# Patient Record
Sex: FEMALE | Race: BLACK OR AFRICAN AMERICAN | NOT HISPANIC OR LATINO | Employment: OTHER | URBAN - METROPOLITAN AREA
[De-identification: names, ages, dates, MRNs, and addresses within clinical notes are randomized per-mention and may not be internally consistent; named-entity substitution may affect disease eponyms.]

---

## 2023-10-10 ENCOUNTER — CLINICAL SUPPORT (OUTPATIENT)
Dept: OBSTETRICS AND GYNECOLOGY | Facility: HOSPITAL | Age: 43
End: 2023-10-10
Payer: MEDICAID

## 2023-10-10 DIAGNOSIS — Z30.42 ENCOUNTER FOR DEPO-PROVERA CONTRACEPTION: Primary | ICD-10-CM

## 2023-10-10 PROCEDURE — 2500000004 HC RX 250 GENERAL PHARMACY W/ HCPCS (ALT 636 FOR OP/ED): Performed by: OBSTETRICS & GYNECOLOGY

## 2023-10-10 RX ORDER — MEDROXYPROGESTERONE ACETATE 150 MG/ML
150 INJECTION, SUSPENSION INTRAMUSCULAR ONCE
Status: COMPLETED | OUTPATIENT
Start: 2023-10-10 | End: 2023-10-10

## 2023-10-10 RX ADMIN — MEDROXYPROGESTERONE ACETATE 150 MG: 150 INJECTION, SUSPENSION INTRAMUSCULAR at 10:20

## 2023-10-10 NOTE — PROGRESS NOTES
[facility]  [address]  [facility] [address]  [phone]     OBGYN Nurse Visit with   Cristiane Ramírez RN  Leida Lizama 1980   MRN: [id number]      Encounter Date: 10/10/2023    Diagnoses and all orders for this visit:  Encounter for Depo-Provera contraception (Primary)  -     medroxyPROGESTERone (Depo-Provera) injection 150 mg       received her last injection on 7/21  is due for her next injection between 12/26 and 1/9  last seen in 7/21  will need an annual exam in 2/17/2023  denies any concerns with injection   educated on the importance of compliance  given injection in the right Deltoid region, patient tolerated well  encouraged to schedule return visit prior to leaving the office  encouraged to call office if she has nay future questions or concerns  Patient verbalized understanding.  KATERINA Pope-RN

## 2023-11-06 ENCOUNTER — HOSPITAL ENCOUNTER (EMERGENCY)
Facility: HOSPITAL | Age: 43
Discharge: HOME | End: 2023-11-06
Attending: EMERGENCY MEDICINE
Payer: MEDICAID

## 2023-11-06 VITALS
WEIGHT: 160 LBS | SYSTOLIC BLOOD PRESSURE: 119 MMHG | HEIGHT: 65 IN | TEMPERATURE: 97.8 F | RESPIRATION RATE: 16 BRPM | OXYGEN SATURATION: 98 % | DIASTOLIC BLOOD PRESSURE: 78 MMHG | BODY MASS INDEX: 26.66 KG/M2 | HEART RATE: 86 BPM

## 2023-11-06 DIAGNOSIS — R00.2 PALPITATIONS: Primary | ICD-10-CM

## 2023-11-06 LAB
ALBUMIN SERPL BCP-MCNC: 4.4 G/DL (ref 3.4–5)
ALP SERPL-CCNC: 44 U/L (ref 33–110)
ALT SERPL W P-5'-P-CCNC: 12 U/L (ref 7–45)
ANION GAP SERPL CALC-SCNC: 15 MMOL/L (ref 10–20)
AST SERPL W P-5'-P-CCNC: 15 U/L (ref 9–39)
BASOPHILS # BLD AUTO: 0.04 X10*3/UL (ref 0–0.1)
BASOPHILS NFR BLD AUTO: 0.8 %
BILIRUB SERPL-MCNC: 0.9 MG/DL (ref 0–1.2)
BUN SERPL-MCNC: 11 MG/DL (ref 6–23)
CALCIUM SERPL-MCNC: 9.3 MG/DL (ref 8.6–10.6)
CHLORIDE SERPL-SCNC: 108 MMOL/L (ref 98–107)
CO2 SERPL-SCNC: 22 MMOL/L (ref 21–32)
CREAT SERPL-MCNC: 0.74 MG/DL (ref 0.5–1.05)
EOSINOPHIL # BLD AUTO: 0.1 X10*3/UL (ref 0–0.7)
EOSINOPHIL NFR BLD AUTO: 2 %
ERYTHROCYTE [DISTWIDTH] IN BLOOD BY AUTOMATED COUNT: 13.8 % (ref 11.5–14.5)
GFR SERPL CREATININE-BSD FRML MDRD: >90 ML/MIN/1.73M*2
GLUCOSE SERPL-MCNC: 88 MG/DL (ref 74–99)
HCT VFR BLD AUTO: 42.1 % (ref 36–46)
HGB BLD-MCNC: 13.3 G/DL (ref 12–16)
IMM GRANULOCYTES # BLD AUTO: 0.01 X10*3/UL (ref 0–0.7)
IMM GRANULOCYTES NFR BLD AUTO: 0.2 % (ref 0–0.9)
LYMPHOCYTES # BLD AUTO: 1.15 X10*3/UL (ref 1.2–4.8)
LYMPHOCYTES NFR BLD AUTO: 23.5 %
MAGNESIUM SERPL-MCNC: 2.04 MG/DL (ref 1.6–2.4)
MCH RBC QN AUTO: 27.5 PG (ref 26–34)
MCHC RBC AUTO-ENTMCNC: 31.6 G/DL (ref 32–36)
MCV RBC AUTO: 87 FL (ref 80–100)
MONOCYTES # BLD AUTO: 0.24 X10*3/UL (ref 0.1–1)
MONOCYTES NFR BLD AUTO: 4.9 %
NEUTROPHILS # BLD AUTO: 3.36 X10*3/UL (ref 1.2–7.7)
NEUTROPHILS NFR BLD AUTO: 68.6 %
NRBC BLD-RTO: 0 /100 WBCS (ref 0–0)
PLATELET # BLD AUTO: 298 X10*3/UL (ref 150–450)
POTASSIUM SERPL-SCNC: 4.2 MMOL/L (ref 3.5–5.3)
PROT SERPL-MCNC: 7.8 G/DL (ref 6.4–8.2)
RBC # BLD AUTO: 4.83 X10*6/UL (ref 4–5.2)
SODIUM SERPL-SCNC: 141 MMOL/L (ref 136–145)
WBC # BLD AUTO: 4.9 X10*3/UL (ref 4.4–11.3)

## 2023-11-06 PROCEDURE — 80053 COMPREHEN METABOLIC PANEL: CPT | Performed by: STUDENT IN AN ORGANIZED HEALTH CARE EDUCATION/TRAINING PROGRAM

## 2023-11-06 PROCEDURE — 99285 EMERGENCY DEPT VISIT HI MDM: CPT | Performed by: EMERGENCY MEDICINE

## 2023-11-06 PROCEDURE — 36415 COLL VENOUS BLD VENIPUNCTURE: CPT | Performed by: STUDENT IN AN ORGANIZED HEALTH CARE EDUCATION/TRAINING PROGRAM

## 2023-11-06 PROCEDURE — 99283 EMERGENCY DEPT VISIT LOW MDM: CPT

## 2023-11-06 PROCEDURE — 99284 EMERGENCY DEPT VISIT MOD MDM: CPT | Performed by: EMERGENCY MEDICINE

## 2023-11-06 PROCEDURE — 85025 COMPLETE CBC W/AUTO DIFF WBC: CPT | Performed by: STUDENT IN AN ORGANIZED HEALTH CARE EDUCATION/TRAINING PROGRAM

## 2023-11-06 PROCEDURE — 93005 ELECTROCARDIOGRAM TRACING: CPT

## 2023-11-06 PROCEDURE — 83735 ASSAY OF MAGNESIUM: CPT | Performed by: STUDENT IN AN ORGANIZED HEALTH CARE EDUCATION/TRAINING PROGRAM

## 2023-11-06 ASSESSMENT — PAIN SCALES - GENERAL: PAINLEVEL_OUTOF10: 5 - MODERATE PAIN

## 2023-11-06 ASSESSMENT — PAIN - FUNCTIONAL ASSESSMENT: PAIN_FUNCTIONAL_ASSESSMENT: 0-10

## 2023-11-06 ASSESSMENT — PAIN DESCRIPTION - LOCATION: LOCATION: CHEST

## 2023-11-06 ASSESSMENT — PAIN DESCRIPTION - PAIN TYPE: TYPE: ACUTE PAIN

## 2023-11-06 NOTE — ED PROVIDER NOTES
HPI   Chief Complaint   Patient presents with    Chest Pain     CP x a while       43-year-old female with history of prior meningioma status postresection 2021 presenting to the emergency department complaints of palpitations intermittently for the past month.  No palpitations happen at rest, notwith exercise, no exercise intolerance, no significant chest pain or shortness of breath.  Has not followed up with cardiology, does not have a PCP.  Denies any history of syncope, near syncope, family history of syncope or sudden cardiac death.  Denies any other complaints at this time.  States no significant headaches, no dizziness, chills, abdominal pain, nausea, vomiting      History provided by:  Patient                      No data recorded                Patient History   Past Medical History:   Diagnosis Date    Acute candidiasis of vulva and vagina     Vaginal candidiasis    Acute vaginitis 10/30/2014    Bacterial vaginosis    Acute vaginitis 08/22/2019    Bacterial vaginosis    Candidiasis, unspecified 03/09/2020    Yeast infection    Dermatitis, unspecified 08/22/2019    Eczema    Encounter for gynecological examination (general) (routine) without abnormal findings 08/24/2018    Encounter for well woman exam with routine gynecological exam    Encounter for pregnancy test, result negative 05/04/2020    Negative pregnancy test    Encounter for screening for infections with a predominantly sexual mode of transmission 07/30/2014    Routine screening for STI (sexually transmitted infection)    Encounter for screening for malignant neoplasm of vagina     Vaginal Pap smear    Encounter for surveillance of injectable contraceptive 01/10/2022    Encounter for Depo-Provera contraception    Excessive and frequent menstruation with irregular cycle 02/10/2020    Breakthrough bleeding on Depo-Provera    Genetic susceptibility to other disease     Heterozygous MTHFR mutation [id number]    Irregular menstruation, unspecified  10/30/2014    Irregular menstrual cycle    Left lower quadrant pain     Left lower quadrant pain    Maternal care for unspecified type scar from previous  delivery 2016    Previous  delivery affecting pregnancy    Other conditions influencing health status     H/O pregnancy    Other conditions influencing health status     Menstruation    Ovarian cyst 2013    Ovarian cyst    Personal history of diseases of the blood and blood-forming organs and certain disorders involving the immune mechanism     History of anemia    Personal history of other benign neoplasm     History of uterine leiomyoma    Personal history of other diseases of the female genital tract 10/04/2019    History of irregular menstrual cycles    Personal history of other infectious and parasitic diseases     History of sexually transmitted disease    Personal history of other specified conditions     History of vomiting    Personal history of other specified conditions     History of nausea    Personal history of other specified conditions 10/30/2014    History of abdominal pain    Type A blood, Rh positive     Blood type A+    Unspecified infection of urinary tract in pregnancy, unspecified trimester 2016    UTI (urinary tract infection) in pregnancy, antepartum    Unspecified pre-eclampsia, unspecified trimester     Preeclampsia    Vomiting of pregnancy, unspecified 2016    Nausea/vomiting in pregnancy     Past Surgical History:   Procedure Laterality Date     SECTION, CLASSIC  2014     Section    COLPOSCOPY  2014    Colposcopy    DILATION AND CURETTAGE OF UTERUS  2014    Dilation And Curettage    OTHER SURGICAL HISTORY  2014    Surgical Treatment Of Spontaneous     TUBAL LIGATION  2016    Tubal Ligation     No family history on file.  Social History     Tobacco Use    Smoking status: Not on file    Smokeless tobacco: Not on file   Substance Use Topics     Alcohol use: Not on file    Drug use: Not on file       Physical Exam   ED Triage Vitals [11/06/23 0959]   Temp Heart Rate Resp BP   36.6 °C (97.8 °F) 86 16 119/78      SpO2 Temp src Heart Rate Source Patient Position   98 % -- Monitor --      BP Location FiO2 (%)     -- --       Physical Exam  Vitals and nursing note reviewed.   Constitutional:       General: She is not in acute distress.     Appearance: She is well-developed.   HENT:      Head: Normocephalic and atraumatic.   Eyes:      Conjunctiva/sclera: Conjunctivae normal.   Cardiovascular:      Rate and Rhythm: Normal rate and regular rhythm.      Pulses:           Radial pulses are 2+ on the right side and 2+ on the left side.      Heart sounds: Normal heart sounds. No murmur heard.     No S3 or S4 sounds.   Pulmonary:      Effort: Pulmonary effort is normal. No respiratory distress.      Breath sounds: Normal breath sounds.   Abdominal:      Palpations: Abdomen is soft.      Tenderness: There is no abdominal tenderness.   Musculoskeletal:         General: No swelling.      Cervical back: Neck supple.   Skin:     General: Skin is warm and dry.   Neurological:      General: No focal deficit present.      Mental Status: She is alert.   Psychiatric:         Mood and Affect: Mood normal.         ED Course & MDM   ED Course as of 11/06/23 1244   Mon Nov 06, 2023   1058 ECG 12 lead  EKG obtained at 1003 demonstrated normal sinus rhythm with mild sinus arrhythmia, rate of 85, normal axis, normal intervals, no ST segment or T wave signs of ischemia.  waves, delta waves, evidence of brugada syndrome. [SH]   1151 CBC and Auto Differential(!)  CBC with normal white blood cell count, normal hemoglobin, platelets [SH]   1221 Comprehensive metabolic panel(!)  CMP normal [SH]   1221 Magnesium  Mag normal [SH]      ED Course User Index  [SH] Ross Luna MD         Diagnoses as of 11/06/23 1244   Palpitations       Medical Decision Making  43-year-old female presents  emergency department with palpitations for the past month.  On arrival vital signs within normal limits, patient well-appearing.  Exam unremarkable.  Labs obtained, unremarkable for etiology of her palpitations.  No exercise intolerance.  Low concern for intermittent arrhythmias given lack of syncopal episodes, no family risk factors.  EKG obtained, also unremarkable for risk factor for syncope.  Will discharge at this time with referral to cardiology as well as primary care.  Discussed return precautions.    As a result of the work-up, patient was discharged home.  They were informed of their diagnosis and instructed to come back with any concerns or worsening of condition and was agreeable to the plan as discussed above.  The patient was given the opportunity to ask questions.  All of the patient's questions were answered.  The patient remained stable under my care.    Amount and/or Complexity of Data Reviewed  Labs: ordered. Decision-making details documented in ED Course.  ECG/medicine tests: ordered and independent interpretation performed. Decision-making details documented in ED Course.        Procedure  Procedures     Ross Luna MD  Resident  11/06/23 [id number]

## 2023-11-06 NOTE — Clinical Note
Leida Lizama was seen and treated in our emergency department on 11/6/2023.  She may return to work on 11/07/2023.       If you have any questions or concerns, please don't hesitate to call.      Ross Luna MD

## 2023-11-06 NOTE — DISCHARGE INSTRUCTIONS
Please call the above numbers to schedule follow-up appoint with primary care as well as cardiology.  Please return with any worsening of your symptoms or any other concerns.

## 2023-11-07 ENCOUNTER — CLINICAL SUPPORT (OUTPATIENT)
Dept: EMERGENCY MEDICINE | Facility: HOSPITAL | Age: 43
End: 2023-11-07
Payer: MEDICAID

## 2023-11-07 LAB
ATRIAL RATE: 85 BPM
P AXIS: 60 DEGREES
P OFFSET: 200 MS
P ONSET: 149 MS
PR INTERVAL: 144 MS
Q ONSET: 221 MS
QRS COUNT: 14 BEATS
QRS DURATION: 78 MS
QT INTERVAL: 348 MS
QTC CALCULATION(BAZETT): 414 MS
QTC FREDERICIA: 390 MS
R AXIS: 86 DEGREES
T AXIS: 36 DEGREES
T OFFSET: 395 MS
VENTRICULAR RATE: 85 BPM

## 2023-12-20 PROBLEM — O47.03 THREATENED PREMATURE LABOR IN THIRD TRIMESTER (HHS-HCC): Status: ACTIVE | Noted: 2023-12-20
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
## 2023-12-20 PROBLEM — R82.90 ABNORMAL URINE: Status: ACTIVE | Noted: 2023-12-20

## 2023-12-20 PROBLEM — S39.012A LUMBAR STRAIN: Status: ACTIVE | Noted: 2023-12-20

## 2023-12-20 PROBLEM — M25.473 ANKLE SWELLING: Status: ACTIVE | Noted: 2023-12-20

## 2023-12-20 PROBLEM — M54.2 NECK PAIN: Status: ACTIVE | Noted: 2021-09-17

## 2023-12-20 PROBLEM — D32.0 BENIGN NEOPLASM OF CEREBRAL MENINGES (MULTI): Chronic | Status: ACTIVE | Noted: 2022-05-05

## 2023-12-20 PROBLEM — O36.8190: Status: ACTIVE | Noted: 2023-12-20

## 2023-12-20 PROBLEM — D31.60: Status: ACTIVE | Noted: 2023-12-20

## 2023-12-20 PROBLEM — D32.9 MENINGIOMA (MULTI): Status: ACTIVE | Noted: 2023-12-20

## 2023-12-20 PROBLEM — H10.9 CONJUNCTIVITIS: Status: ACTIVE | Noted: 2023-12-20

## 2023-12-20 PROBLEM — R07.9 CHEST PAIN: Status: ACTIVE | Noted: 2021-11-08

## 2023-12-20 PROBLEM — S39.012A BACK STRAIN: Status: ACTIVE | Noted: 2023-12-20

## 2023-12-20 PROBLEM — M54.6 PAIN IN THORACIC SPINE: Status: ACTIVE | Noted: 2021-09-17

## 2023-12-20 PROBLEM — K21.9 GASTRO-ESOPHAGEAL REFLUX DISEASE WITHOUT ESOPHAGITIS: Status: ACTIVE | Noted: 2021-12-11
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
## 2023-12-20 PROBLEM — N89.8 VAGINAL ODOR: Status: ACTIVE | Noted: 2023-12-20

## 2023-12-20 PROBLEM — B37.31 YEAST VAGINITIS: Status: ACTIVE | Noted: 2023-12-20

## 2023-12-20 PROBLEM — O34.219 PREVIOUS CESAREAN DELIVERY AFFECTING PREGNANCY (HHS-HCC): Status: ACTIVE | Noted: 2023-12-20

## 2023-12-20 PROBLEM — H47.099 COMPRESSIVE OPTIC NEUROPATHY: Status: ACTIVE | Noted: 2023-12-20

## 2023-12-20 PROBLEM — B96.89 BACTERIAL VAGINOSIS: Status: ACTIVE | Noted: 2023-12-20

## 2023-12-20 PROBLEM — H46.8 COMPRESSIVE OPTIC NEUROPATHY: Status: ACTIVE | Noted: 2023-12-20

## 2023-12-20 PROBLEM — H05.10: Status: ACTIVE | Noted: 2023-12-20

## 2023-12-20 PROBLEM — H54.61 UNQUALIFIED VISUAL LOSS, RIGHT EYE, NORMAL VISION LEFT EYE: Status: ACTIVE | Noted: 2021-12-11

## 2023-12-20 PROBLEM — L70.9 ACNE: Status: ACTIVE | Noted: 2022-01-12

## 2023-12-20 PROBLEM — D31.91: Status: ACTIVE | Noted: 2023-12-20

## 2023-12-20 PROBLEM — O99.019 MATERNAL IRON DEFICIENCY ANEMIA AFFECTING PREGNANCY, ANTEPARTUM (HHS-HCC): Status: ACTIVE | Noted: 2023-12-20

## 2023-12-20 PROBLEM — L30.9 ECZEMA: Status: ACTIVE | Noted: 2023-12-20

## 2023-12-20 PROBLEM — A59.01 TRICHOMONAS VAGINITIS: Status: ACTIVE | Noted: 2023-12-20

## 2023-12-20 PROBLEM — N76.0 BACTERIAL VAGINOSIS: Status: ACTIVE | Noted: 2023-12-20

## 2023-12-20 PROBLEM — B36.0 PITYRIASIS VERSICOLOR: Status: ACTIVE | Noted: 2022-01-12

## 2023-12-20 PROBLEM — N73.0 PID (ACUTE PELVIC INFLAMMATORY DISEASE): Status: ACTIVE | Noted: 2023-12-20

## 2023-12-20 PROBLEM — N39.0 URINARY TRACT INFECTION: Status: ACTIVE | Noted: 2023-12-20

## 2023-12-20 PROBLEM — D50.9 MATERNAL IRON DEFICIENCY ANEMIA AFFECTING PREGNANCY, ANTEPARTUM (HHS-HCC): Status: ACTIVE | Noted: 2023-12-20

## 2023-12-20 PROBLEM — O34.219 DELIVERY WITH HISTORY OF CESAREAN SECTION (HHS-HCC): Status: ACTIVE | Noted: 2023-12-20

## 2023-12-20 PROBLEM — S16.1XXA CERVICAL STRAIN: Status: ACTIVE | Noted: 2023-12-20

## 2023-12-20 PROBLEM — H93.A2 SUBJECTIVE PULSATILE TINNITUS OF LEFT EAR: Status: ACTIVE | Noted: 2023-12-20

## 2023-12-20 PROBLEM — D31.61: Status: ACTIVE | Noted: 2022-05-05

## 2023-12-20 PROBLEM — H53.40 VISUAL FIELD DEFECT OF RIGHT EYE: Status: ACTIVE | Noted: 2023-12-20

## 2023-12-20 PROBLEM — N89.8 VAGINAL DISCHARGE: Status: ACTIVE | Noted: 2023-12-20

## 2023-12-20 PROBLEM — H05.20 PROPTOSIS: Status: ACTIVE | Noted: 2023-12-20

## 2023-12-20 PROBLEM — H90.3 SENSORINEURAL HEARING LOSS (SNHL) OF BOTH EARS: Status: ACTIVE | Noted: 2022-02-16

## 2023-12-20 PROBLEM — N92.1 BREAKTHROUGH BLEEDING ON DEPO-PROVERA: Status: ACTIVE | Noted: 2023-12-20

## 2023-12-20 PROBLEM — H54.7 VISION LOSS: Status: ACTIVE | Noted: 2023-12-20

## 2023-12-20 RX ORDER — FLUTICASONE PROPIONATE 50 MCG
1 SPRAY, SUSPENSION (ML) NASAL DAILY
COMMUNITY
Start: 2022-12-27 | End: 2024-03-12 | Stop reason: WASHOUT

## 2023-12-20 RX ORDER — CETIRIZINE HYDROCHLORIDE 10 MG/1
10 TABLET ORAL DAILY
COMMUNITY
Start: 2023-05-11 | End: 2024-03-12 | Stop reason: WASHOUT

## 2023-12-20 RX ORDER — MEDROXYPROGESTERONE ACETATE 150 MG/ML
150 INJECTION, SUSPENSION INTRAMUSCULAR
COMMUNITY

## 2023-12-20 RX ORDER — MEDROXYPROGESTERONE ACETATE 150 MG/ML
1 INJECTION, SUSPENSION INTRAMUSCULAR
COMMUNITY
End: 2024-03-12 | Stop reason: WASHOUT

## 2023-12-20 RX ORDER — CHLORHEXIDINE GLUCONATE 40 MG/ML
SOLUTION TOPICAL
COMMUNITY
Start: 2021-11-30 | End: 2024-03-12 | Stop reason: WASHOUT

## 2023-12-20 RX ORDER — IBUPROFEN 800 MG/1
1 TABLET ORAL EVERY 8 HOURS PRN
COMMUNITY
Start: 2018-10-04 | End: 2024-03-12 | Stop reason: WASHOUT

## 2023-12-20 RX ORDER — CEPHALEXIN 500 MG/1
500 CAPSULE ORAL 3 TIMES DAILY
COMMUNITY
Start: 2023-03-14 | End: 2024-03-12 | Stop reason: WASHOUT

## 2023-12-20 RX ORDER — METRONIDAZOLE 7.5 MG/G
1 GEL VAGINAL
COMMUNITY
Start: 2022-08-30 | End: 2024-03-12 | Stop reason: WASHOUT

## 2023-12-20 RX ORDER — PROMETHAZINE HYDROCHLORIDE 25 MG/1
25 TABLET ORAL EVERY 6 HOURS PRN
COMMUNITY
Start: 2023-03-14 | End: 2024-03-12 | Stop reason: WASHOUT

## 2023-12-20 RX ORDER — KETOCONAZOLE 20 MG/ML
SHAMPOO, SUSPENSION TOPICAL
COMMUNITY
Start: 2018-09-04 | End: 2024-03-12 | Stop reason: WASHOUT

## 2023-12-20 RX ORDER — MUPIROCIN 20 MG/G
1 OINTMENT TOPICAL 2 TIMES DAILY
COMMUNITY
Start: 2021-11-30 | End: 2024-03-12 | Stop reason: WASHOUT

## 2023-12-20 RX ORDER — LORATADINE 10 MG/1
10 TABLET ORAL DAILY PRN
COMMUNITY
Start: 2022-12-09 | End: 2024-03-12 | Stop reason: WASHOUT

## 2023-12-20 RX ORDER — LIDOCAINE 50 MG/G
1 PATCH TOPICAL DAILY
COMMUNITY
Start: 2023-07-10 | End: 2024-03-12 | Stop reason: WASHOUT

## 2023-12-20 RX ORDER — ADAPALENE 1 MG/G
GEL TOPICAL
COMMUNITY
Start: 2018-09-04 | End: 2024-03-12 | Stop reason: WASHOUT

## 2023-12-20 RX ORDER — OXYCODONE AND ACETAMINOPHEN 5; 325 MG/1; MG/1
1 TABLET ORAL EVERY 4 HOURS PRN
COMMUNITY
Start: 2023-03-17 | End: 2024-03-12 | Stop reason: WASHOUT

## 2023-12-27 ENCOUNTER — CLINICAL SUPPORT (OUTPATIENT)
Dept: OBSTETRICS AND GYNECOLOGY | Facility: CLINIC | Age: 43
End: 2023-12-27
Payer: MEDICAID

## 2023-12-27 DIAGNOSIS — Z30.42 ENCOUNTER FOR DEPO-PROVERA CONTRACEPTION: Primary | ICD-10-CM

## 2023-12-27 PROCEDURE — 96372 THER/PROPH/DIAG INJ SC/IM: CPT | Performed by: OBSTETRICS & GYNECOLOGY

## 2023-12-27 PROCEDURE — 2500000004 HC RX 250 GENERAL PHARMACY W/ HCPCS (ALT 636 FOR OP/ED): Mod: SE | Performed by: OBSTETRICS & GYNECOLOGY

## 2023-12-27 RX ORDER — MEDROXYPROGESTERONE ACETATE 150 MG/ML
150 INJECTION, SUSPENSION INTRAMUSCULAR ONCE
Status: COMPLETED | OUTPATIENT
Start: 2023-12-27 | End: 2023-12-27

## 2023-12-27 RX ADMIN — MEDROXYPROGESTERONE ACETATE 150 MG: 150 INJECTION, SUSPENSION INTRAMUSCULAR at 10:30

## 2023-12-27 ASSESSMENT — PAIN - FUNCTIONAL ASSESSMENT: PAIN_FUNCTIONAL_ASSESSMENT: 0-10

## 2023-12-27 ASSESSMENT — PAIN SCALES - GENERAL: PAINLEVEL_OUTOF10: 0 - NO PAIN

## 2023-12-27 NOTE — PROGRESS NOTES
Patient here for Depo injection.  Last Depo: 10/10/23  Last Annual: 02/17/23  RN discussed Depo-Provera side effects.  Depo given IM into right deltoid. Depo supplied by office.   Patient tolerated well.  Patient to RTC between 3/14/24-4/4/24 for depo and annual 2/2024.  Patient verbalized understanding and all questions were answered.

## 2024-01-02 ENCOUNTER — ANCILLARY PROCEDURE (OUTPATIENT)
Dept: RADIOLOGY | Facility: CLINIC | Age: 44
End: 2024-01-02
Payer: MEDICAID

## 2024-01-02 VITALS — HEIGHT: 65 IN | BODY MASS INDEX: 26.45 KG/M2 | WEIGHT: 158.73 LBS

## 2024-01-02 DIAGNOSIS — D32.9 BENIGN NEOPLASM OF MENINGES, UNSPECIFIED (MULTI): ICD-10-CM

## 2024-01-02 PROCEDURE — A9575 INJ GADOTERATE MEGLUMI 0.1ML: HCPCS | Mod: SE | Performed by: STUDENT IN AN ORGANIZED HEALTH CARE EDUCATION/TRAINING PROGRAM

## 2024-01-02 PROCEDURE — 2550000001 HC RX 255 CONTRASTS: Mod: SE | Performed by: STUDENT IN AN ORGANIZED HEALTH CARE EDUCATION/TRAINING PROGRAM

## 2024-01-02 PROCEDURE — 70553 MRI BRAIN STEM W/O & W/DYE: CPT | Performed by: RADIOLOGY

## 2024-01-02 PROCEDURE — 70553 MRI BRAIN STEM W/O & W/DYE: CPT

## 2024-01-02 RX ORDER — GADOTERATE MEGLUMINE 376.9 MG/ML
0.2 INJECTION INTRAVENOUS
Status: COMPLETED | OUTPATIENT
Start: 2024-01-02 | End: 2024-01-02

## 2024-01-02 RX ADMIN — GADOTERATE MEGLUMINE 14 ML: 376.9 INJECTION INTRAVENOUS at 09:50

## 2024-01-05 ENCOUNTER — TELEPHONE (OUTPATIENT)
Dept: RADIATION ONCOLOGY | Facility: HOSPITAL | Age: 44
End: 2024-01-05
Payer: COMMERCIAL

## 2024-01-09 ENCOUNTER — APPOINTMENT (OUTPATIENT)
Dept: RADIATION ONCOLOGY | Facility: HOSPITAL | Age: 44
End: 2024-01-09
Payer: MEDICAID

## 2024-01-09 ENCOUNTER — TELEPHONE (OUTPATIENT)
Dept: RADIATION ONCOLOGY | Facility: HOSPITAL | Age: 44
End: 2024-01-09
Payer: COMMERCIAL

## 2024-01-09 NOTE — TELEPHONE ENCOUNTER
Called pt. to remind of appointment on 1/15/2024 at 1:00 pm with Dr. Falcon. Pt answered and confirmed  appointment.

## 2024-01-15 ENCOUNTER — HOSPITAL ENCOUNTER (OUTPATIENT)
Dept: RADIATION ONCOLOGY | Facility: HOSPITAL | Age: 44
Setting detail: RADIATION/ONCOLOGY SERIES
Discharge: HOME | End: 2024-01-15
Payer: COMMERCIAL

## 2024-01-15 VITALS
OXYGEN SATURATION: 99 % | BODY MASS INDEX: 26.26 KG/M2 | DIASTOLIC BLOOD PRESSURE: 78 MMHG | HEIGHT: 65 IN | SYSTOLIC BLOOD PRESSURE: 130 MMHG | HEART RATE: 82 BPM | TEMPERATURE: 96.8 F | RESPIRATION RATE: 18 BRPM | WEIGHT: 157.63 LBS

## 2024-01-15 DIAGNOSIS — D32.9 MENINGIOMA (MULTI): Primary | ICD-10-CM

## 2024-01-15 PROCEDURE — 99213 OFFICE O/P EST LOW 20 MIN: CPT | Performed by: STUDENT IN AN ORGANIZED HEALTH CARE EDUCATION/TRAINING PROGRAM

## 2024-01-18 ASSESSMENT — ENCOUNTER SYMPTOMS
CARDIOVASCULAR NEGATIVE: 1
LIGHT-HEADEDNESS: 1
CONSTITUTIONAL NEGATIVE: 1
HEADACHES: 1
MUSCULOSKELETAL NEGATIVE: 1
DIZZINESS: 1
NUMBNESS: 1
EYE PROBLEMS: 1
EXTREMITY WEAKNESS: 0
RESPIRATORY NEGATIVE: 1
PSYCHIATRIC NEGATIVE: 1
GASTROINTESTINAL NEGATIVE: 1

## 2024-01-18 NOTE — PROGRESS NOTES
Radiation Oncology Follow-Up    Patient Name:  Leida Lizama  MRN:  [id number]  :  1980    Referring Provider: No ref. provider found  Primary Care Provider: No Assigned PCP Generic Provider, MD  Care Team: Patient Care Team:  No Assigned Pcp Generic Provider, MD as PCP - General (Internal Medicine)    Date of Service: 1/15/2024     SUBJECTIVE  History of Present Illness:   Leida Lizama is a 43 y.o. 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 She follows with ophthalmology, last saw Dr. Underwood 2023. The patient had an ED visit on 23 with palpitations with no intervention. She presents for routine follow-up. The patient continues to endorse blurry vision of her R eye that is stable since her last visit. She endorses persistent numbness of the right side of her face, episodes of dizziness, and left-sided headaches. However, she describes her dizziness episodes as feeling light-headed and hot.    Treatment Rendered:    Right Orbital Base of skull meningioma: Proton 5940 cGy in 33 fractions completed 2022   Left Clivus Meningioma: GKRS 13 Gy to 57% IDL completed on 2022    Review of Systems:   Review of Systems   Constitutional: Negative.    HENT:   Negative for hearing loss.    Eyes:  Positive for eye problems.   Respiratory: Negative.     Cardiovascular: Negative.    Gastrointestinal: Negative.    Musculoskeletal: Negative.    Neurological:  Positive for dizziness, headaches, light-headedness and numbness. Negative for extremity weakness.   Psychiatric/Behavioral: Negative.       The patient's current pain level was assessed.  They report currently having a pain of 1 out of 10.    Performance Status:   The Karnofsky  "performance scale today is 80, Normal activity with effort; some signs or symptoms of disease (ECOG equivalent 1).        OBJECTIVE  Vital Signs:  /78   Pulse 82   Temp 36 °C (96.8 °F) (Skin)   Resp 18   Ht 1.651 m (5' 5\")   Wt 71.5 kg (157 lb 10.1 oz)   SpO2 99%   BMI 26.23 kg/m²    Physical Exam:  Physical Exam  Constitutional:       General: She is not in acute distress.  HENT:      Mouth/Throat:      Mouth: Mucous membranes are moist.   Eyes:      Extraocular Movements: Extraocular movements intact.      Pupils: Pupils are equal, round, and reactive to light.   Cardiovascular:      Rate and Rhythm: Normal rate.   Pulmonary:      Effort: Pulmonary effort is normal. No respiratory distress.   Abdominal:      General: Abdomen is flat.   Musculoskeletal:      Cervical back: Normal range of motion.      Right lower leg: No edema.      Left lower leg: No edema.   Skin:     General: Skin is warm and dry.   Neurological:      Mental Status: She is alert.      Comments: Motor strength 5/5 b/l upper and lower extremities  No sensory deficit  visual fields intact to confrontation   Psychiatric:         Mood and Affect: Mood normal.         Behavior: Behavior normal.     MR BRAIN W AND WO IV CONTRAST;  1/2/2024 10:07 am        COMPARISON:  June 2023.      FINDINGS:  Previous right pterional craniotomy and orbitotomy. Residual bony  hypertrophy at the greater wing of the sphenoid extending into the  right temporal bone and anterior clinoid process unchanged. Unchanged  meningeal thickening and enhancement along the posterior wall of the  left orbit, foramen rotundum and inferior orbital fissure. Thickening  and enhancement extends for a 1 cm distance in the floor of the right  middle cranial fossa and along the lateral dural margin of the right  cavernous sinus. No evidence of extension to the pituitary      No new foci of abnormal enhancement to suggest additional lesions.      Normal contrast opacification of " dural venous sinuses      There is a normal-size ventricular system.  There is no evidence of  intracranial mass or extra-axial collection.  The skull base,  paranasal sinuses and orbital structures are otherwise unremarkable.  Diffusion weighted images and associated ADC maps of the brain were  unremarkable.  There is no evidence of diffusion restriction to  suggest the presence of acute infarction. Gradient echo T2 weighted  images fail to demonstrate hemosiderin deposition or other evidence  of hemorrhage.      IMPRESSION:  * Postoperative changes and residual meningioma as previously  demonstrated and described * There is no measurable change compared  to the previous exam.     ASSESSMENT:  Leida Lizama is a 43 y.o. 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  She presents for routine follow-up with stable neurologic deficits (blurry vision of her R eye, numbness of the right side of her face) with episodes of dizziness, and left-sided headaches.     PLAN:  Follow up in 6 months with MRI brain. Patient was instructed to contact the office with any worsening of her symptoms.    NCCN Guidelines were applicable to guide this patients treatment plan.  The patient was assessed and plan discussed with the attending radiation oncologist Dr. Falcon.    Deepali Burgos MD  PGY-2 Radiation Oncology Resident  On-call pager [phone]  Available on Epic Secure Chat

## 2024-01-18 NOTE — ADDENDUM NOTE
Encounter addended by: Deepali Burgos MD on: 1/18/2024 4:11 PM   Actions taken: Pend clinical note, SmartForm saved, Clinical Note Signed

## 2024-01-22 NOTE — ADDENDUM NOTE
Encounter addended by: Travis Falcon MD on: 1/22/2024 1:02 PM   Actions taken: Level of Service modified, Clinical Note Signed

## 2024-03-12 ENCOUNTER — OFFICE VISIT (OUTPATIENT)
Dept: OBSTETRICS AND GYNECOLOGY | Facility: CLINIC | Age: 44
End: 2024-03-12
Payer: COMMERCIAL

## 2024-03-12 VITALS — HEIGHT: 65 IN | BODY MASS INDEX: 27.16 KG/M2 | WEIGHT: 163 LBS

## 2024-03-12 DIAGNOSIS — B37.9 YEAST INFECTION: ICD-10-CM

## 2024-03-12 DIAGNOSIS — N89.8 VAGINAL DISCHARGE: Primary | ICD-10-CM

## 2024-03-12 PROCEDURE — 1036F TOBACCO NON-USER: CPT | Performed by: OBSTETRICS & GYNECOLOGY

## 2024-03-12 PROCEDURE — 99203 OFFICE O/P NEW LOW 30 MIN: CPT | Performed by: OBSTETRICS & GYNECOLOGY

## 2024-03-12 PROCEDURE — 87205 SMEAR GRAM STAIN: CPT

## 2024-03-12 PROCEDURE — 87800 DETECT AGNT MULT DNA DIREC: CPT | Performed by: OBSTETRICS & GYNECOLOGY

## 2024-03-12 PROCEDURE — 87661 TRICHOMONAS VAGINALIS AMPLIF: CPT | Mod: 59 | Performed by: OBSTETRICS & GYNECOLOGY

## 2024-03-12 RX ORDER — TERCONAZOLE 8 MG/G
1 CREAM VAGINAL NIGHTLY
Qty: 20 G | Refills: 1 | Status: SHIPPED | OUTPATIENT
Start: 2024-03-12 | End: 2024-03-15

## 2024-03-12 ASSESSMENT — ENCOUNTER SYMPTOMS
APPETITE CHANGE: 0
UNEXPECTED WEIGHT CHANGE: 0
COLOR CHANGE: 0
NAUSEA: 0
FATIGUE: 0
CHILLS: 0
VOMITING: 0
FLANK PAIN: 0
DYSURIA: 0
FREQUENCY: 0
BLOOD IN STOOL: 0
CONSTIPATION: 0
DIARRHEA: 0
SHORTNESS OF BREATH: 0
BACK PAIN: 0
HEMATURIA: 0
ABDOMINAL PAIN: 0
SLEEP DISTURBANCE: 0
FEVER: 0
ABDOMINAL DISTENTION: 0

## 2024-03-12 ASSESSMENT — PAIN SCALES - GENERAL: PAINLEVEL: 0-NO PAIN

## 2024-03-12 NOTE — PROGRESS NOTES
Leida Lizama is a 43 y.o.  here for vaginal itching    Pt c/o vaginal itching and discharge for a few weeks.  Feels like a yeast infection.     Menstrual cycles: none with depo provera   Sexually active: yes with male partner   H/o tubal ligation      Past medical and surgical history reviewed.     Obstetric History  OB History    Para Term  AB Living   6       1 5   SAB IAB Ectopic Multiple Live Births                  # Outcome Date GA Lbr Rene/2nd Weight Sex Delivery Anes PTL Lv   6             5             4             3             2             1 AB                 Past Medical History  She has a past medical history of Acute candidiasis of vulva and vagina, Acute vaginitis (10/30/2014), Acute vaginitis (2019), Candidiasis, unspecified (2020), Dermatitis, unspecified (2019), Encounter for gynecological examination (general) (routine) without abnormal findings (2018), Encounter for pregnancy test, result negative (2020), Encounter for screening for infections with a predominantly sexual mode of transmission (2014), Encounter for screening for malignant neoplasm of vagina, Encounter for surveillance of injectable contraceptive (01/10/2022), Excessive and frequent menstruation with irregular cycle (02/10/2020), Genetic susceptibility to other disease, Irregular menstruation, unspecified (10/30/2014), Left lower quadrant pain, Maternal care for unspecified type scar from previous  delivery (2016), Other conditions influencing health status, Other conditions influencing health status, Ovarian cyst (2013), Personal history of diseases of the blood and blood-forming organs and certain disorders involving the immune mechanism, Personal history of other benign neoplasm, Personal history of other diseases of the female genital tract (10/04/2019), Personal history of other infectious and parasitic  "diseases, Personal history of other specified conditions, Personal history of other specified conditions, Personal history of other specified conditions (10/30/2014), Type A blood, Rh positive, Unspecified infection of urinary tract in pregnancy, unspecified trimester (2016), Unspecified pre-eclampsia, unspecified trimester, and Vomiting of pregnancy, unspecified (2016).    Surgical History  She has a past surgical history that includes Tubal ligation (2016);  section, classic (2014); Dilation and curettage of uterus (2014); Other surgical history (2014); and Colposcopy (2014).     Social History  She reports that she has never smoked. She has never used smokeless tobacco. She reports that she does not currently use alcohol. She reports that she does not use drugs.    Family History  No family history on file.      Ht 1.651 m (5' 5\")   Wt 73.9 kg (163 lb)   LMP  (LMP Unknown)   BMI 27.12 kg/m²   No LMP recorded (lmp unknown). Patient has had an ablation.    Review of Systems   Constitutional:  Negative for appetite change, chills, fatigue, fever and unexpected weight change.   Respiratory:  Negative for shortness of breath.    Cardiovascular:  Negative for chest pain.   Gastrointestinal:  Negative for abdominal distention, abdominal pain, blood in stool, constipation, diarrhea, nausea and vomiting.   Endocrine: Negative for cold intolerance and heat intolerance.   Genitourinary:  Positive for vaginal discharge and vaginal pain (vagnial itching). Negative for dyspareunia, dysuria, flank pain, frequency, genital sores, hematuria, menstrual problem, pelvic pain, urgency and vaginal bleeding.   Musculoskeletal:  Negative for back pain.   Skin:  Negative for color change.   Psychiatric/Behavioral:  Negative for sleep disturbance.        Physical Exam  Constitutional:       Appearance: Normal appearance.   Genitourinary:     General: Normal vulva.      Vagina: Vaginal " discharge (thick, white, clumpy) and erythema present.      Cervix: Normal.      Uterus: Normal.       Adnexa: Right adnexa normal and left adnexa normal.   Skin:     General: Skin is warm and dry.   Neurological:      Mental Status: She is alert.   Psychiatric:         Mood and Affect: Mood normal.         Behavior: Behavior normal.           Assessment and Plan:    Vaginal itching  Exam c/w yeast infection  STI testing and vaginitis panel sent  Terconazole Rx to patient's pharmacy - pt not good with pills, requests vaginal cream

## 2024-03-13 ENCOUNTER — TELEPHONE (OUTPATIENT)
Dept: OBSTETRICS AND GYNECOLOGY | Facility: CLINIC | Age: 44
End: 2024-03-13
Payer: COMMERCIAL

## 2024-03-13 LAB
C TRACH RRNA SPEC QL NAA+PROBE: NEGATIVE
CLUE CELLS VAG LPF-#/AREA: ABNORMAL /[LPF]
N GONORRHOEA DNA SPEC QL PROBE+SIG AMP: NEGATIVE
NUGENT SCORE: 2
T VAGINALIS RRNA SPEC QL NAA+PROBE: NEGATIVE
YEAST VAG WET PREP-#/AREA: PRESENT

## 2024-03-13 NOTE — TELEPHONE ENCOUNTER
Called patient to discuss results.  Identified by name and .  Informed patient of her results and medication that has been sent in.  Patient states she started taking yesterday, verbalized understanding of results.  Denies any questions or concerns at this time.  Encouraged to call the office if symptoms persist or she has any questions or concerns.    KATERINA Willis RN      ----- Message from Ginger PRASAD MD sent at 3/13/2024  2:44 PM EDT -----  Pt's testing came back positive for yeast. Rx for terconazole sent to pharmacy at time of visit. Thanks. MCKAY

## 2024-03-14 ENCOUNTER — CLINICAL SUPPORT (OUTPATIENT)
Dept: OBSTETRICS AND GYNECOLOGY | Facility: CLINIC | Age: 44
End: 2024-03-14
Payer: COMMERCIAL

## 2024-03-14 DIAGNOSIS — Z30.42 DEPO-PROVERA CONTRACEPTIVE STATUS: Primary | ICD-10-CM

## 2024-03-14 PROCEDURE — 96372 THER/PROPH/DIAG INJ SC/IM: CPT | Performed by: ADVANCED PRACTICE MIDWIFE

## 2024-03-14 PROCEDURE — 2500000004 HC RX 250 GENERAL PHARMACY W/ HCPCS (ALT 636 FOR OP/ED): Performed by: ADVANCED PRACTICE MIDWIFE

## 2024-03-14 RX ORDER — MEDROXYPROGESTERONE ACETATE 150 MG/ML
150 INJECTION, SUSPENSION INTRAMUSCULAR ONCE
Status: COMPLETED | OUTPATIENT
Start: 2024-03-14 | End: 2024-03-14

## 2024-03-14 RX ADMIN — MEDROXYPROGESTERONE ACETATE 150 MG: 150 INJECTION, SUSPENSION INTRAMUSCULAR at 08:55

## 2024-03-14 NOTE — PROGRESS NOTES
Pt here for depo. Happy with depo. Discussed increasing calcium in diet . Pt needs ape- advised to schedule. Next depo due 5/30-6/20

## 2024-03-18 ENCOUNTER — TELEPHONE (OUTPATIENT)
Dept: OBSTETRICS AND GYNECOLOGY | Facility: CLINIC | Age: 44
End: 2024-03-18
Payer: COMMERCIAL

## 2024-03-18 NOTE — TELEPHONE ENCOUNTER
Called patient to inform her of discarded labs  Left vm for patient to return call    SHRAVAN WillisN RN

## 2024-04-21 ENCOUNTER — HOSPITAL ENCOUNTER (EMERGENCY)
Facility: HOSPITAL | Age: 44
Discharge: HOME | End: 2024-04-21
Attending: EMERGENCY MEDICINE
Payer: COMMERCIAL

## 2024-04-21 ENCOUNTER — APPOINTMENT (OUTPATIENT)
Dept: RADIOLOGY | Facility: HOSPITAL | Age: 44
End: 2024-04-21
Payer: COMMERCIAL

## 2024-04-21 VITALS
HEIGHT: 65 IN | TEMPERATURE: 98.1 F | WEIGHT: 165 LBS | BODY MASS INDEX: 27.49 KG/M2 | SYSTOLIC BLOOD PRESSURE: 118 MMHG | HEART RATE: 74 BPM | DIASTOLIC BLOOD PRESSURE: 85 MMHG | OXYGEN SATURATION: 98 % | RESPIRATION RATE: 18 BRPM

## 2024-04-21 DIAGNOSIS — S93.402A SPRAIN OF LEFT ANKLE, INITIAL ENCOUNTER: Primary | ICD-10-CM

## 2024-04-21 PROCEDURE — 99284 EMERGENCY DEPT VISIT MOD MDM: CPT | Performed by: EMERGENCY MEDICINE

## 2024-04-21 PROCEDURE — 73610 X-RAY EXAM OF ANKLE: CPT | Mod: RT

## 2024-04-21 PROCEDURE — 73630 X-RAY EXAM OF FOOT: CPT | Mod: RIGHT SIDE | Performed by: RADIOLOGY

## 2024-04-21 PROCEDURE — 99284 EMERGENCY DEPT VISIT MOD MDM: CPT

## 2024-04-21 PROCEDURE — 73610 X-RAY EXAM OF ANKLE: CPT | Mod: RIGHT SIDE | Performed by: RADIOLOGY

## 2024-04-21 PROCEDURE — 73630 X-RAY EXAM OF FOOT: CPT | Mod: RT

## 2024-04-21 PROCEDURE — 2500000001 HC RX 250 WO HCPCS SELF ADMINISTERED DRUGS (ALT 637 FOR MEDICARE OP): Performed by: STUDENT IN AN ORGANIZED HEALTH CARE EDUCATION/TRAINING PROGRAM

## 2024-04-21 RX ORDER — OXYCODONE HYDROCHLORIDE 5 MG/1
5 TABLET ORAL ONCE
Status: COMPLETED | OUTPATIENT
Start: 2024-04-21 | End: 2024-04-21

## 2024-04-21 RX ORDER — NAPROXEN 500 MG/1
500 TABLET ORAL
Qty: 30 TABLET | Refills: 0 | Status: SHIPPED | OUTPATIENT
Start: 2024-04-21 | End: 2024-05-02 | Stop reason: ALTCHOICE

## 2024-04-21 RX ORDER — NAPROXEN 500 MG/1
500 TABLET ORAL ONCE
Status: COMPLETED | OUTPATIENT
Start: 2024-04-21 | End: 2024-04-21

## 2024-04-21 RX ORDER — OXYCODONE HYDROCHLORIDE 5 MG/1
5 TABLET ORAL EVERY 6 HOURS PRN
Qty: 8 TABLET | Refills: 0 | Status: SHIPPED | OUTPATIENT
Start: 2024-04-21 | End: 2024-04-23

## 2024-04-21 RX ADMIN — OXYCODONE HYDROCHLORIDE 5 MG: 5 TABLET ORAL at 20:05

## 2024-04-21 RX ADMIN — NAPROXEN 500 MG: 500 TABLET ORAL at 20:05

## 2024-04-21 ASSESSMENT — COLUMBIA-SUICIDE SEVERITY RATING SCALE - C-SSRS
2. HAVE YOU ACTUALLY HAD ANY THOUGHTS OF KILLING YOURSELF?: NO
1. IN THE PAST MONTH, HAVE YOU WISHED YOU WERE DEAD OR WISHED YOU COULD GO TO SLEEP AND NOT WAKE UP?: NO
6. HAVE YOU EVER DONE ANYTHING, STARTED TO DO ANYTHING, OR PREPARED TO DO ANYTHING TO END YOUR LIFE?: NO

## 2024-04-21 NOTE — ED TRIAGE NOTES
Pt to ED c/o right ankle pain. Pt states she slipped down 8 stairs and landed on her right ankle. Pt denies hitting her head or losing consciousness; denies any blood thinners. Pt was ambulatory after fall. MSP's intact. Pt rates pain 8/10 .

## 2024-04-24 NOTE — ED PROVIDER NOTES
HPI   Chief Complaint   Patient presents with    Ankle Pain       HPI  The patient is a 43-year-old female with no significant past medical history presents emergency department with chief complaint of left ankle injury.  Patient states that she stumbled down several stairs and turned her ankle.  She demonstrates an inversion type injury.  She was after stable to ambulate however as time went on has been unable to ambulate and has had significantly more pain and swelling in the area.  She denies any paralysis or paresthesias.  Denies any loss of consciousness and states it was purely mechanical loss of balance.      PMH:as above.  Meds:reviewed in EMR.  PSH:reviewed in EMR.  allergies:reviewed in EMR.  social:Denies X3.  Family History: non-contributory to acute presentation.    A full 10 point Review of Systems was reviewed with the patient and is negative unless stated in the HPI.                  No data recorded                   Patient History   Past Medical History:   Diagnosis Date    Acute candidiasis of vulva and vagina     Vaginal candidiasis    Acute vaginitis 10/30/2014    Bacterial vaginosis    Acute vaginitis 08/22/2019    Bacterial vaginosis    Candidiasis, unspecified 03/09/2020    Yeast infection    Dermatitis, unspecified 08/22/2019    Eczema    Encounter for gynecological examination (general) (routine) without abnormal findings 08/24/2018    Encounter for well woman exam with routine gynecological exam    Encounter for pregnancy test, result negative 05/04/2020    Negative pregnancy test    Encounter for screening for infections with a predominantly sexual mode of transmission 07/30/2014    Routine screening for STI (sexually transmitted infection)    Encounter for screening for malignant neoplasm of vagina     Vaginal Pap smear    Encounter for surveillance of injectable contraceptive 01/10/2022    Encounter for Depo-Provera contraception    Excessive and frequent menstruation with irregular  cycle 02/10/2020    Breakthrough bleeding on Depo-Provera    Genetic susceptibility to other disease     Heterozygous MTHFR mutation [id number]    Irregular menstruation, unspecified 10/30/2014    Irregular menstrual cycle    Left lower quadrant pain     Left lower quadrant pain    Maternal care for unspecified type scar from previous  delivery ([facility]) 2016    Previous  delivery affecting pregnancy    Other conditions influencing health status     H/O pregnancy    Other conditions influencing health status     Menstruation    Ovarian cyst 2013    Ovarian cyst    Personal history of diseases of the blood and blood-forming organs and certain disorders involving the immune mechanism     History of anemia    Personal history of other benign neoplasm     History of uterine leiomyoma    Personal history of other diseases of the female genital tract 10/04/2019    History of irregular menstrual cycles    Personal history of other infectious and parasitic diseases     History of sexually transmitted disease    Personal history of other specified conditions     History of vomiting    Personal history of other specified conditions     History of nausea    Personal history of other specified conditions 10/30/2014    History of abdominal pain    Type A blood, Rh positive     Blood type A+    Unspecified infection of urinary tract in pregnancy, unspecified trimester ([facility]) 2016    UTI (urinary tract infection) in pregnancy, antepartum    Unspecified pre-eclampsia, unspecified trimester ([facility])     Preeclampsia    Vomiting of pregnancy, unspecified ([facility]) 2016    Nausea/vomiting in pregnancy     Past Surgical History:   Procedure Laterality Date     SECTION, CLASSIC  2014     Section    COLPOSCOPY  2014    Colposcopy    DILATION AND CURETTAGE OF UTERUS  2014    Dilation And Curettage    OTHER SURGICAL HISTORY  2014    Surgical Treatment Of  Spontaneous     TUBAL LIGATION  2016    Tubal Ligation     No family history on file.  Social History     Tobacco Use    Smoking status: Never    Smokeless tobacco: Never   Vaping Use    Vaping status: Never Used   Substance Use Topics    Alcohol use: Not Currently    Drug use: Never       Physical Exam   ED Triage Vitals [24 1937]   Temperature Heart Rate Respirations BP   36.7 °C (98.1 °F) 74 18 118/85      Pulse Ox Temp src Heart Rate Source Patient Position   98 % -- -- --      BP Location FiO2 (%)     -- --       Physical Exam    Physical Exam:    Appearance: Alert, oriented , cooperative,  in no acute distress. Well nourished & well hydrated.    Skin: Intact,  dry skin, no lesions, rash, petechiae or purpura.     Eyes: PERRLA, EOMs intact,  No scleral injection. No scleral icterus.     ENT: Hearing grossly intact. External auditory canals patent. Nares patent, mucus membranes moist. Dentition without lesions.     Neck: Supple, without meningismus. Trachea at midline.     Pulmonary: Clear bilaterally with good chest wall excursion. No rales, rhonchi or wheezing. No accessory muscle use or stridor.    Cardiac: Normal S1, S2 without murmur, rub, gallop or extrasystole. No JVD, Carotids without bruits.    Abdomen: Soft, nontender.  No palpable organomegaly.  No rebound or guarding.      Genitourinary: Exam deferred.    Musculoskeletal: There is significant left lateral malleoli are edema and tenderness to palpation over the distal fibular region.  There is left midfoot tenderness to palpation.  Pulses are 2+.  Sensation light touch and motor function grossly intact.    Neurological:  Moving all 4 extremities equally, no focal findings identified    Psychiatric: Appropriate mood and affect.     ED Course & MDM   Diagnoses as of 24 0001   Sprain of left ankle, initial encounter       Medical Decision Making  The patient is a 43-year-old female who presented to emergency department with left  ankle pain, swelling and inability to ambulate after an inversion type ankle sprain after falling down some stairs earlier tonight.  She was hemodynamically stable and afebrile on arrival.  She had significant lateral malleoli are edema and tenderness to palpation over the distal fibular region.  She also had midfoot tenderness to palpation.  Due to these things and inability to ambulate in the ED, she failed the [place] foot and ankle rules and x-rays of the left ankle and foot were obtained.  She was treated with anti-inflammatories and pain medication and ice was applied.  Her x-rays showed significant soft tissue swelling in the lateral malleoli region but no bony abnormalities in the foot or ankle.  The mortise was intact.      Patient was prescribed pain medication and instructed to RICE her injury.  Crutches were Provided and the patient was provided a work note.  She was discharged home at this point stable condition.    Patient was discussed with Dr. Harris who agrees.      Lars Hirsch MD  Emergency Medicine, PGY3    Procedure  Procedures     Alex Hirsch MD  Resident  04/24/24 0008

## 2024-04-29 ENCOUNTER — APPOINTMENT (OUTPATIENT)
Dept: OBSTETRICS AND GYNECOLOGY | Facility: HOSPITAL | Age: 44
End: 2024-04-29
Payer: COMMERCIAL

## 2024-05-02 ENCOUNTER — OFFICE VISIT (OUTPATIENT)
Dept: OBSTETRICS AND GYNECOLOGY | Facility: CLINIC | Age: 44
End: 2024-05-02
Payer: COMMERCIAL

## 2024-05-02 VITALS
SYSTOLIC BLOOD PRESSURE: 115 MMHG | WEIGHT: 162 LBS | DIASTOLIC BLOOD PRESSURE: 83 MMHG | BODY MASS INDEX: 26.96 KG/M2 | HEART RATE: 99 BPM

## 2024-05-02 DIAGNOSIS — Z11.3 ROUTINE SCREENING FOR STI (SEXUALLY TRANSMITTED INFECTION): Primary | ICD-10-CM

## 2024-05-02 DIAGNOSIS — N89.8 VAGINAL ODOR: ICD-10-CM

## 2024-05-02 DIAGNOSIS — Z01.419 WELL WOMAN EXAM: ICD-10-CM

## 2024-05-02 DIAGNOSIS — S93.401D SPRAIN OF RIGHT ANKLE, UNSPECIFIED LIGAMENT, SUBSEQUENT ENCOUNTER: ICD-10-CM

## 2024-05-02 DIAGNOSIS — S99.921D INJURY OF RIGHT FOOT, SUBSEQUENT ENCOUNTER: ICD-10-CM
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
## 2024-05-02 PROBLEM — K21.9 GASTRO-ESOPHAGEAL REFLUX DISEASE WITHOUT ESOPHAGITIS: Status: RESOLVED | Noted: 2021-12-11 | Resolved: 2024-05-02

## 2024-05-02 PROBLEM — O99.019 MATERNAL IRON DEFICIENCY ANEMIA AFFECTING PREGNANCY, ANTEPARTUM (HHS-HCC): Status: RESOLVED | Noted: 2023-12-20 | Resolved: 2024-05-02
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
## 2024-05-02 PROBLEM — H93.A2 SUBJECTIVE PULSATILE TINNITUS OF LEFT EAR: Status: RESOLVED | Noted: 2023-12-20 | Resolved: 2024-05-02

## 2024-05-02 PROBLEM — D31.60: Status: RESOLVED | Noted: 2023-12-20 | Resolved: 2024-05-02

## 2024-05-02 PROBLEM — O47.03 THREATENED PREMATURE LABOR IN THIRD TRIMESTER (HHS-HCC): Status: RESOLVED | Noted: 2023-12-20 | Resolved: 2024-05-02

## 2024-05-02 PROBLEM — N76.0 BACTERIAL VAGINOSIS: Status: RESOLVED | Noted: 2023-12-20 | Resolved: 2024-05-02

## 2024-05-02 PROBLEM — B36.0 PITYRIASIS VERSICOLOR: Status: RESOLVED | Noted: 2022-01-12 | Resolved: 2024-05-02

## 2024-05-02 PROBLEM — H53.40 VISUAL FIELD DEFECT OF RIGHT EYE: Status: RESOLVED | Noted: 2023-12-20 | Resolved: 2024-05-02

## 2024-05-02 PROBLEM — N73.0 PID (ACUTE PELVIC INFLAMMATORY DISEASE): Status: RESOLVED | Noted: 2023-12-20 | Resolved: 2024-05-02

## 2024-05-02 PROBLEM — O34.219 PREVIOUS CESAREAN DELIVERY AFFECTING PREGNANCY (HHS-HCC): Status: RESOLVED | Noted: 2023-12-20 | Resolved: 2024-05-02

## 2024-05-02 PROBLEM — S39.012A BACK STRAIN: Status: RESOLVED | Noted: 2023-12-20 | Resolved: 2024-05-02

## 2024-05-02 PROBLEM — H54.7 VISION LOSS: Status: RESOLVED | Noted: 2023-12-20 | Resolved: 2024-05-02

## 2024-05-02 PROBLEM — M54.6 PAIN IN THORACIC SPINE: Status: RESOLVED | Noted: 2021-09-17 | Resolved: 2024-05-02

## 2024-05-02 PROBLEM — D32.0 BENIGN NEOPLASM OF CEREBRAL MENINGES (MULTI): Chronic | Status: RESOLVED | Noted: 2022-05-05 | Resolved: 2024-05-02

## 2024-05-02 PROBLEM — B96.89 BACTERIAL VAGINOSIS: Status: RESOLVED | Noted: 2023-12-20 | Resolved: 2024-05-02

## 2024-05-02 PROBLEM — D31.61: Status: RESOLVED | Noted: 2022-05-05 | Resolved: 2024-05-02

## 2024-05-02 PROBLEM — L70.9 ACNE: Status: RESOLVED | Noted: 2022-01-12 | Resolved: 2024-05-02

## 2024-05-02 PROBLEM — H05.20 PROPTOSIS: Status: RESOLVED | Noted: 2023-12-20 | Resolved: 2024-05-02

## 2024-05-02 PROBLEM — H90.3 SENSORINEURAL HEARING LOSS (SNHL) OF BOTH EARS: Status: RESOLVED | Noted: 2022-02-16 | Resolved: 2024-05-02

## 2024-05-02 PROBLEM — S39.012A LUMBAR STRAIN: Status: RESOLVED | Noted: 2023-12-20 | Resolved: 2024-05-02

## 2024-05-02 PROBLEM — A59.01 TRICHOMONAS VAGINITIS: Status: RESOLVED | Noted: 2023-12-20 | Resolved: 2024-05-02

## 2024-05-02 PROBLEM — R82.90 ABNORMAL URINE: Status: RESOLVED | Noted: 2023-12-20 | Resolved: 2024-05-02

## 2024-05-02 PROBLEM — N92.1 BREAKTHROUGH BLEEDING ON DEPO-PROVERA: Status: RESOLVED | Noted: 2023-12-20 | Resolved: 2024-05-02

## 2024-05-02 PROBLEM — S93.401A SPRAIN OF RIGHT ANKLE: Status: ACTIVE | Noted: 2024-05-02

## 2024-05-02 PROBLEM — R07.9 CHEST PAIN: Status: RESOLVED | Noted: 2021-11-08 | Resolved: 2024-05-02

## 2024-05-02 PROBLEM — H46.8 COMPRESSIVE OPTIC NEUROPATHY: Status: RESOLVED | Noted: 2023-12-20 | Resolved: 2024-05-02

## 2024-05-02 PROBLEM — H47.099 COMPRESSIVE OPTIC NEUROPATHY: Status: RESOLVED | Noted: 2023-12-20 | Resolved: 2024-05-02

## 2024-05-02 PROBLEM — D50.9 MATERNAL IRON DEFICIENCY ANEMIA AFFECTING PREGNANCY, ANTEPARTUM (HHS-HCC): Status: RESOLVED | Noted: 2023-12-20 | Resolved: 2024-05-02

## 2024-05-02 PROBLEM — D31.91: Status: RESOLVED | Noted: 2023-12-20 | Resolved: 2024-05-02

## 2024-05-02 PROBLEM — M25.473 ANKLE SWELLING: Status: RESOLVED | Noted: 2023-12-20 | Resolved: 2024-05-02

## 2024-05-02 PROBLEM — H10.9 CONJUNCTIVITIS: Status: RESOLVED | Noted: 2023-12-20 | Resolved: 2024-05-02

## 2024-05-02 PROBLEM — O36.8190: Status: RESOLVED | Noted: 2023-12-20 | Resolved: 2024-05-02

## 2024-05-02 PROBLEM — S16.1XXA CERVICAL STRAIN: Status: RESOLVED | Noted: 2023-12-20 | Resolved: 2024-05-02

## 2024-05-02 PROBLEM — N39.0 URINARY TRACT INFECTION: Status: RESOLVED | Noted: 2023-12-20 | Resolved: 2024-05-02

## 2024-05-02 PROBLEM — H05.10: Status: RESOLVED | Noted: 2023-12-20 | Resolved: 2024-05-02

## 2024-05-02 PROBLEM — M54.2 NECK PAIN: Status: RESOLVED | Noted: 2021-09-17 | Resolved: 2024-05-02

## 2024-05-02 PROBLEM — B37.31 YEAST VAGINITIS: Status: RESOLVED | Noted: 2023-12-20 | Resolved: 2024-05-02

## 2024-05-02 PROBLEM — O34.219 DELIVERY WITH HISTORY OF CESAREAN SECTION (HHS-HCC): Status: RESOLVED | Noted: 2023-12-20 | Resolved: 2024-05-02

## 2024-05-02 PROCEDURE — 87205 SMEAR GRAM STAIN: CPT | Performed by: STUDENT IN AN ORGANIZED HEALTH CARE EDUCATION/TRAINING PROGRAM

## 2024-05-02 PROCEDURE — 99396 PREV VISIT EST AGE 40-64: CPT | Mod: GC | Performed by: STUDENT IN AN ORGANIZED HEALTH CARE EDUCATION/TRAINING PROGRAM

## 2024-05-02 PROCEDURE — 99396 PREV VISIT EST AGE 40-64: CPT | Performed by: STUDENT IN AN ORGANIZED HEALTH CARE EDUCATION/TRAINING PROGRAM

## 2024-05-02 PROCEDURE — 87800 DETECT AGNT MULT DNA DIREC: CPT | Performed by: STUDENT IN AN ORGANIZED HEALTH CARE EDUCATION/TRAINING PROGRAM

## 2024-05-02 PROCEDURE — 1036F TOBACCO NON-USER: CPT | Performed by: STUDENT IN AN ORGANIZED HEALTH CARE EDUCATION/TRAINING PROGRAM

## 2024-05-02 PROCEDURE — 87661 TRICHOMONAS VAGINALIS AMPLIF: CPT | Mod: 59 | Performed by: STUDENT IN AN ORGANIZED HEALTH CARE EDUCATION/TRAINING PROGRAM

## 2024-05-02 ASSESSMENT — ENCOUNTER SYMPTOMS
HEMATOLOGIC/LYMPHATIC NEGATIVE: 0
ENDOCRINE NEGATIVE: 0
ALLERGIC/IMMUNOLOGIC NEGATIVE: 0
MUSCULOSKELETAL NEGATIVE: 0
NEUROLOGICAL NEGATIVE: 0
GASTROINTESTINAL NEGATIVE: 0
CONSTITUTIONAL NEGATIVE: 0
PSYCHIATRIC NEGATIVE: 0
CARDIOVASCULAR NEGATIVE: 0
RESPIRATORY NEGATIVE: 0
EYES NEGATIVE: 0

## 2024-05-02 ASSESSMENT — PAIN SCALES - GENERAL: PAINLEVEL: 0-NO PAIN

## 2024-05-02 NOTE — PROGRESS NOTES
Leida Lizama is a 43 y.o.  who is here for gyn annual visit    Subjective   Feels well. Some vaginal odor without abn discharge. No major concerns.    GynHx:   Periods: amenorrheic on depo since 2016  Sexual concerns: none  Current contraception: Depo Provera and Tubal Sterilization  STIs: would like testing    Screening:  - Pap and HPV: Last  NILM, HPV neg - due next   - Mammogram:  normal, due  - Colonoscopy: at age 47  - DEXA: NA    Problem list, PMHx, SurgHx, SocHx, FHx, medications reviewed and updated in chart.    Objective   /83   Pulse 99   Wt 73.5 kg (162 lb)   BMI 26.96 kg/m²     Declined exam    Assessment/Plan   Leida Lizama is a 43 y.o. female who is here for gyn annual    Problem List Items Addressed This Visit       Sprain of right ankle    Current Assessment & Plan     Seen in ED. Has crutches. Referral to podiatry         Well woman exam    Current Assessment & Plan     Up to date on pap. STI testing ordered. Mammogram ordered. Plan for colonoscopy at age 47. Not due for DEXA         Relevant Orders    BI mammo bilateral screening tomosynthesis    Vaginal odor    Current Assessment & Plan     BV/yeast self swab, declined exam         Relevant Orders    Vaginitis Gram Stain For Bacterial Vaginosis + Yeast     RTC in 1 yr for annual or PRN. D/w Dr. Shawn Greenberg MD  PGY-3, Obstetrics and Gynecology

## 2024-05-02 NOTE — ASSESSMENT & PLAN NOTE
Up to date on pap. STI testing ordered. Mammogram ordered. Plan for colonoscopy at age 47. Not due for DEXA

## 2024-05-03 LAB
C TRACH RRNA SPEC QL NAA+PROBE: NEGATIVE
CLUE CELLS VAG LPF-#/AREA: PRESENT /[LPF]
N GONORRHOEA DNA SPEC QL PROBE+SIG AMP: NEGATIVE
NUGENT SCORE: 8
T VAGINALIS RRNA SPEC QL NAA+PROBE: NEGATIVE
YEAST VAG WET PREP-#/AREA: ABNORMAL

## 2024-05-03 NOTE — PROGRESS NOTES
I reviewed the resident/fellow's documentation and discussed the patient with the resident/fellow. I agree with the resident/fellow's medical decision making as documented in the note.    Andrew Escobar MD

## 2024-05-08 DIAGNOSIS — N89.8 VAGINAL ODOR: Primary | ICD-10-CM

## 2024-05-08 RX ORDER — METRONIDAZOLE 500 MG/1
500 TABLET ORAL 2 TIMES DAILY
Qty: 14 TABLET | Refills: 0 | Status: SHIPPED | OUTPATIENT
Start: 2024-05-08 | End: 2024-05-14 | Stop reason: SINTOL

## 2024-05-14 DIAGNOSIS — N76.0 BACTERIAL VAGINOSIS: Primary | ICD-10-CM

## 2024-05-14 DIAGNOSIS — B96.89 BACTERIAL VAGINOSIS: Primary | ICD-10-CM

## 2024-05-14 RX ORDER — METRONIDAZOLE 7.5 MG/G
GEL VAGINAL DAILY
Qty: 70 G | Refills: 0 | Status: SHIPPED | OUTPATIENT
Start: 2024-05-14 | End: 2024-05-19

## 2024-05-21 ENCOUNTER — TELEPHONE (OUTPATIENT)
Dept: OBSTETRICS AND GYNECOLOGY | Facility: CLINIC | Age: 44
End: 2024-05-21
Payer: COMMERCIAL

## 2024-05-21 DIAGNOSIS — N89.8 VAGINAL ODOR: Primary | ICD-10-CM

## 2024-05-21 RX ORDER — FLUCONAZOLE 150 MG/1
150 TABLET ORAL ONCE
Qty: 1 TABLET | Refills: 0 | Status: SHIPPED | OUTPATIENT
Start: 2024-05-21 | End: 2024-05-21

## 2024-05-21 NOTE — TELEPHONE ENCOUNTER
Lm for pt that rx she requested was sent----- Message from Luanne Greenberg MD sent at 5/21/2024  2:25 PM EDT -----  Done!  ----- Message -----  From: WM Cooley RN  Sent: 5/20/2024   4:18 PM EDT  To: Luanne Greenberg MD    Pt is c/o yeast infection after metronidazole nd requesting diflucan be sent

## 2024-05-30 ENCOUNTER — CLINICAL SUPPORT (OUTPATIENT)
Dept: OBSTETRICS AND GYNECOLOGY | Facility: CLINIC | Age: 44
End: 2024-05-30
Payer: COMMERCIAL

## 2024-05-30 DIAGNOSIS — Z30.42 ENCOUNTER FOR DEPO-PROVERA CONTRACEPTION: Primary | ICD-10-CM

## 2024-05-30 PROCEDURE — 2500000004 HC RX 250 GENERAL PHARMACY W/ HCPCS (ALT 636 FOR OP/ED): Performed by: ADVANCED PRACTICE MIDWIFE

## 2024-05-30 PROCEDURE — 96372 THER/PROPH/DIAG INJ SC/IM: CPT | Performed by: ADVANCED PRACTICE MIDWIFE

## 2024-05-30 RX ORDER — MEDROXYPROGESTERONE ACETATE 150 MG/ML
150 INJECTION, SUSPENSION INTRAMUSCULAR ONCE
Status: COMPLETED | OUTPATIENT
Start: 2024-05-30 | End: 2024-05-30

## 2024-05-30 RX ADMIN — MEDROXYPROGESTERONE ACETATE 150 MG: 150 INJECTION, SUSPENSION INTRAMUSCULAR at 10:15

## 2024-05-30 NOTE — PROGRESS NOTES
Here for depo provera     Reports no complaints on depo  Reminded to take in ca while on depo   Return 8/15/  9/5/24 next dose  Annual due  5/2025  Given today from clinic stock  Tolerated well

## 2024-07-15 ENCOUNTER — HOSPITAL ENCOUNTER (OUTPATIENT)
Dept: RADIATION ONCOLOGY | Facility: HOSPITAL | Age: 44
Setting detail: RADIATION/ONCOLOGY SERIES
Discharge: HOME | End: 2024-07-15

## 2024-07-15 ENCOUNTER — HOSPITAL ENCOUNTER (OUTPATIENT)
Dept: RADIOLOGY | Facility: HOSPITAL | Age: 44
Discharge: HOME | End: 2024-07-15
Payer: MEDICARE

## 2024-07-15 DIAGNOSIS — D32.9 MENINGIOMA (MULTI): ICD-10-CM

## 2024-07-15 PROCEDURE — 99213 OFFICE O/P EST LOW 20 MIN: CPT | Performed by: STUDENT IN AN ORGANIZED HEALTH CARE EDUCATION/TRAINING PROGRAM

## 2024-07-15 ASSESSMENT — ENCOUNTER SYMPTOMS
CONSTITUTIONAL NEGATIVE: 1
MUSCULOSKELETAL NEGATIVE: 1
PSYCHIATRIC NEGATIVE: 1
HEADACHES: 1
GASTROINTESTINAL NEGATIVE: 1
CARDIOVASCULAR NEGATIVE: 1
RESPIRATORY NEGATIVE: 1
EYES NEGATIVE: 1
ENDOCRINE NEGATIVE: 1

## 2024-07-15 NOTE — PROGRESS NOTES
Radiation Oncology Nursing Note    Pain: The patient's current pain level was assessed.  They report currently having a pain of 0 out of 10.  They feel their pain is under control without the use of pain medications.    Review of Systems:  Review of Systems   Constitutional: Negative.    HENT:  Negative.     Eyes: Negative.    Respiratory: Negative.     Cardiovascular: Negative.    Gastrointestinal: Negative.    Endocrine: Negative.    Genitourinary: Negative.     Musculoskeletal: Negative.    Skin: Negative.    Neurological:  Positive for headaches.   Psychiatric/Behavioral: Negative.

## 2024-07-15 NOTE — PROGRESS NOTES
Radiation Oncology Follow-Up    Patient Name:  Leida Lizama  MRN:  [id number]  :  1980    Referring Provider: Travis Falcon MD, *  Primary Care Provider: No Assigned PCP Generic Provider, MD  Care Team: Patient Care Team:  No Assigned Pcp Generic Provider, MD as PCP - General (Internal Medicine)    Date of Service: 7/15/2024     SUBJECTIVE  History of Present Illness:   Leida Lizama is a 43 y.o. 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 She follows with ophthalmology, last saw Dr. Underwood 2023. The patient had an ED visit on 23 with palpitations with no intervention. She presents for routine follow-up. The patient continues to endorse vision loss of her R eye. She endorses persistent numbness of the right side of her face. She states that she is doing well otherwise. She feels that her right sided swelling is subsiding.     Treatment Rendered:    Right Orbital Base of skull meningioma: Proton 5940 cGy in 33 fractions completed 2022   Left Clivus Meningioma: GKRS 13 Gy to 57% IDL completed on 2022    Review of Systems:  Please see the nursing note for complete ROS.     The patient's current pain level was assessed.  They report currently having a pain of 1 out of 10.    Performance Status:   The Karnofsky performance scale today is 80, Normal activity with effort; some signs or symptoms of disease (ECOG equivalent 1).        OBJECTIVE  Vital Signs:  There were no vitals taken for this visit.   Physical Exam:  Physical Exam  Constitutional:       General: She is not in acute distress.  HENT:      Mouth/Throat:      Mouth: Mucous membranes are moist.   Eyes:      Extraocular Movements: Extraocular movements intact.       Pupils: Pupils are equal, round, and reactive to light.   Cardiovascular:      Rate and Rhythm: Normal rate.   Pulmonary:      Effort: Pulmonary effort is normal. No respiratory distress.   Abdominal:      General: Abdomen is flat.   Musculoskeletal:      Cervical back: Normal range of motion.      Right lower leg: No edema.      Left lower leg: No edema.   Skin:     General: Skin is warm and dry.   Neurological:      Mental Status: She is alert.      Comments: Motor strength 5/5 b/l upper and lower extremities  No sensory deficit   Psychiatric:         Mood and Affect: Mood normal.         Behavior: Behavior normal.     MR BRAIN W AND WO IV CONTRAST;  1/2/2024 10:07 am        COMPARISON:  June 2023.      FINDINGS:  Previous right pterional craniotomy and orbitotomy. Residual bony  hypertrophy at the greater wing of the sphenoid extending into the  right temporal bone and anterior clinoid process unchanged. Unchanged  meningeal thickening and enhancement along the posterior wall of the  left orbit, foramen rotundum and inferior orbital fissure. Thickening  and enhancement extends for a 1 cm distance in the floor of the right  middle cranial fossa and along the lateral dural margin of the right  cavernous sinus. No evidence of extension to the pituitary      No new foci of abnormal enhancement to suggest additional lesions.      Normal contrast opacification of dural venous sinuses      There is a normal-size ventricular system.  There is no evidence of  intracranial mass or extra-axial collection.  The skull base,  paranasal sinuses and orbital structures are otherwise unremarkable.  Diffusion weighted images and associated ADC maps of the brain were  unremarkable.  There is no evidence of diffusion restriction to  suggest the presence of acute infarction. Gradient echo T2 weighted  images fail to demonstrate hemosiderin deposition or other evidence  of hemorrhage.      IMPRESSION:  * Postoperative changes and  residual meningioma as previously  demonstrated and described * There is no measurable change compared  to the previous exam.     ASSESSMENT:  Leida Lizama is a 43 y.o. 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  She presents for routine follow-up with stable neurologic deficits. She was not able obtain her MRI brain today.     PLAN:  Obtain MRI brain and call office after to discuss. Return to clinic in 6 months with a new MRI brain.   Follow-up with ophthalmology/Dr. Underwood as needed.

## 2024-07-16 ENCOUNTER — HOSPITAL ENCOUNTER (OUTPATIENT)
Dept: RADIOLOGY | Facility: HOSPITAL | Age: 44
Discharge: HOME | End: 2024-07-16
Payer: MEDICARE

## 2024-07-16 PROCEDURE — A9575 INJ GADOTERATE MEGLUMI 0.1ML: HCPCS | Mod: SE | Performed by: STUDENT IN AN ORGANIZED HEALTH CARE EDUCATION/TRAINING PROGRAM

## 2024-07-16 PROCEDURE — 70553 MRI BRAIN STEM W/O & W/DYE: CPT | Performed by: RADIOLOGY

## 2024-07-16 PROCEDURE — 2550000001 HC RX 255 CONTRASTS: Mod: SE | Performed by: STUDENT IN AN ORGANIZED HEALTH CARE EDUCATION/TRAINING PROGRAM

## 2024-07-16 PROCEDURE — 70553 MRI BRAIN STEM W/O & W/DYE: CPT

## 2024-07-16 RX ORDER — GADOTERATE MEGLUMINE 376.9 MG/ML
15 INJECTION INTRAVENOUS
Status: COMPLETED | OUTPATIENT
Start: 2024-07-16 | End: 2024-07-16

## 2024-07-17 ENCOUNTER — TELEPHONE (OUTPATIENT)
Dept: RADIATION ONCOLOGY | Facility: HOSPITAL | Age: 44
End: 2024-07-17
Payer: MEDICARE

## 2024-07-17 DIAGNOSIS — D32.9 MENINGIOMA (MULTI): Primary | ICD-10-CM

## 2024-08-22 ENCOUNTER — OFFICE VISIT (OUTPATIENT)
Dept: OBSTETRICS AND GYNECOLOGY | Facility: CLINIC | Age: 44
End: 2024-08-22
Payer: MEDICARE

## 2024-08-22 DIAGNOSIS — Z30.42 ENCOUNTER FOR SURVEILLANCE OF INJECTABLE CONTRACEPTIVE: ICD-10-CM

## 2024-08-22 RX ORDER — MEDROXYPROGESTERONE ACETATE 150 MG/ML
150 INJECTION, SUSPENSION INTRAMUSCULAR ONCE
Status: COMPLETED | OUTPATIENT
Start: 2024-08-22 | End: 2024-08-22

## 2024-08-22 NOTE — PROGRESS NOTES
Last Depo:5/30/2024  Next Due:11/7/2024-11/21/2024  MARIFER:2/17/2023  UPT:declined  LMP: unknown  Complaints:none  Site Given: LDeltoid  NDC:46050-377-35  LOT:[id number]  EXP:10/2025

## 2024-11-05 ENCOUNTER — APPOINTMENT (OUTPATIENT)
Dept: PRIMARY CARE | Facility: CLINIC | Age: 44
End: 2024-11-05
Payer: MEDICARE

## 2024-12-06 ENCOUNTER — OFFICE VISIT (OUTPATIENT)
Dept: OBSTETRICS AND GYNECOLOGY | Facility: HOSPITAL | Age: 44
End: 2024-12-06
Payer: MEDICARE

## 2024-12-06 VITALS
DIASTOLIC BLOOD PRESSURE: 78 MMHG | SYSTOLIC BLOOD PRESSURE: 120 MMHG | BODY MASS INDEX: 27.16 KG/M2 | WEIGHT: 163 LBS | HEIGHT: 65 IN

## 2024-12-06 DIAGNOSIS — Z30.8 ENCOUNTER FOR OTHER CONTRACEPTIVE MANAGEMENT: Primary | ICD-10-CM

## 2024-12-06 PROBLEM — N89.8 VAGINAL ODOR: Status: RESOLVED | Noted: 2024-05-02 | Resolved: 2024-12-06

## 2024-12-06 PROBLEM — Z01.419 WELL WOMAN EXAM: Status: RESOLVED | Noted: 2024-05-02 | Resolved: 2024-12-06

## 2024-12-06 PROCEDURE — 99213 OFFICE O/P EST LOW 20 MIN: CPT | Performed by: OBSTETRICS & GYNECOLOGY

## 2024-12-06 PROCEDURE — 1036F TOBACCO NON-USER: CPT | Performed by: OBSTETRICS & GYNECOLOGY

## 2024-12-06 PROCEDURE — 3008F BODY MASS INDEX DOCD: CPT | Performed by: OBSTETRICS & GYNECOLOGY

## 2024-12-06 ASSESSMENT — ENCOUNTER SYMPTOMS
WEAKNESS: 0
COUGH: 0
DEPRESSION: 0
ABDOMINAL PAIN: 0
NAUSEA: 0
LOSS OF SENSATION IN FEET: 0
HEADACHES: 0
CHILLS: 0
FEVER: 0
HEMATURIA: 0
SHORTNESS OF BREATH: 0
PALPITATIONS: 0
DIARRHEA: 0
DIZZINESS: 0
DYSURIA: 0
OCCASIONAL FEELINGS OF UNSTEADINESS: 0
CONSTIPATION: 0
VOMITING: 0
FREQUENCY: 0

## 2024-12-06 ASSESSMENT — PAIN SCALES - GENERAL: PAINLEVEL_OUTOF10: 0-NO PAIN

## 2024-12-06 NOTE — PROGRESS NOTES
SUBJECTIVE    44 y.o.  Injection presents to discuss the patch for menstrual management    OB/GYN History  No LMP recorded. Patient has had an injection.    Social History     Substance and Sexual Activity   Sexual Activity Yes    Partners: Male    Birth control/protection: Injection       Sexually transmitted infections:no past history    OB History    Para Term  AB Living   6 5   5 1 4   SAB IAB Ectopic Multiple Live Births     1     1      # Outcome Date GA Lbr Rene/2nd Weight Sex Type Anes PTL Lv   6       CS-Unspec      5       CS-Unspec      4       CS-Unspec      3       CS-Unspec         Complications: Abruptio Placenta   2       CS-Unspec   ND      Complications: Fetal Intolerance   1 IAB 2002     Medical Marie          The following portions of the chart were reviewed this encounter and updated as appropriate:           Screenings  Social Drivers of Health     Tobacco Use: Low Risk  (2024)    Patient History     Smoking Tobacco Use: Never     Smokeless Tobacco Use: Never     Passive Exposure: Not on file   Alcohol Use: Not on file   Financial Resource Strain: Not on File (2022)    Received from [facility]    Financial Resource Strain     Financial Resource Strain: 0   Food Insecurity: Not on File (2024)    Received from [facility]    Food Insecurity     Food: 0   Transportation Needs: Not on File (2022)    Received from [facility]    Transportation Needs     Transportation: 0   Physical Activity: Not on File (2022)    Received from [facility]    Physical Activity     Physical Activity: 0   Stress: Not on File (2022)    Received from [facility]    Stress     Stress: 0   Social Connections: Not on File (2024)    Received from [facility]    Social Connections     Connectedness: 0   Intimate Partner Violence: Not on file   Depression: Not on file   Housing Stability: Not on File (2022)    Received  "from [facility]    Housing Stability     Housin   Utilities: Not on file   Digital Equity: Not on file   Health Literacy: Not on file         Review of Systems  Review of Systems   Constitutional:  Negative for chills and fever.   Eyes:  Negative for visual disturbance.   Respiratory:  Negative for cough and shortness of breath.    Cardiovascular:  Negative for chest pain and palpitations.   Gastrointestinal:  Negative for abdominal pain, constipation, diarrhea, nausea and vomiting.   Genitourinary:  Negative for dyspareunia, dysuria, frequency, hematuria, urgency, vaginal bleeding and vaginal discharge.   Neurological:  Negative for dizziness, weakness and headaches.        OBJECTIVE  Vitals:    24 1517   BP: 120/78   Weight: 73.9 kg (163 lb)   Height: 1.651 m (5' 5\")     Body mass index is 27.12 kg/m².     Physical Exam  Constitutional:       Appearance: Normal appearance.   HENT:      Head: Normocephalic and atraumatic.      Nose: Nose normal.      Mouth/Throat:      Mouth: Mucous membranes are moist.   Eyes:      Extraocular Movements: Extraocular movements intact.      Pupils: Pupils are equal, round, and reactive to light.   Cardiovascular:      Rate and Rhythm: Normal rate.   Pulmonary:      Effort: Pulmonary effort is normal.   Musculoskeletal:         General: Normal range of motion.      Cervical back: Normal range of motion.   Neurological:      General: No focal deficit present.      Mental Status: She is alert and oriented to person, place, and time.   Skin:     General: Skin is warm and dry.   Psychiatric:         Mood and Affect: Mood normal.         Behavior: Behavior normal.   Vitals and nursing note reviewed.          Last Pap: approximate date 2023 and was normal      ASSESSMENT & PLAN  Problem List Items Addressed This Visit          Ob-Gyn Problems    Encounter for other contraceptive management - Primary    Overview     - Patient has a history of a tubal ligation using Depo for " menstrual management  - Meningioma was not completely resected  - Potentially interested in the patch for menstrual management - discussed that the patch does have estrogen and progesterone in it - discussed that some meningiomas do have hormonal receptors  - Is opting for no additional hormonal contraception at this time which I think is very reasonable - will follow periods to see if they are manageable            Follow up: As needed    Gabi Pringle MD  Obstetrics & Gynecology  12/06/24

## 2024-12-24 ENCOUNTER — HOSPITAL ENCOUNTER (EMERGENCY)
Facility: HOSPITAL | Age: 44
Discharge: HOME | End: 2024-12-24
Attending: STUDENT IN AN ORGANIZED HEALTH CARE EDUCATION/TRAINING PROGRAM
Payer: MEDICARE

## 2024-12-24 VITALS
HEART RATE: 71 BPM | OXYGEN SATURATION: 100 % | SYSTOLIC BLOOD PRESSURE: 134 MMHG | DIASTOLIC BLOOD PRESSURE: 85 MMHG | TEMPERATURE: 97 F | RESPIRATION RATE: 18 BRPM | WEIGHT: 165 LBS | HEIGHT: 65 IN | BODY MASS INDEX: 27.49 KG/M2

## 2024-12-24 DIAGNOSIS — K08.89 PAIN, DENTAL: Primary | ICD-10-CM

## 2024-12-24 PROCEDURE — 2500000001 HC RX 250 WO HCPCS SELF ADMINISTERED DRUGS (ALT 637 FOR MEDICARE OP): Performed by: STUDENT IN AN ORGANIZED HEALTH CARE EDUCATION/TRAINING PROGRAM

## 2024-12-24 PROCEDURE — 2500000005 HC RX 250 GENERAL PHARMACY W/O HCPCS: Performed by: STUDENT IN AN ORGANIZED HEALTH CARE EDUCATION/TRAINING PROGRAM

## 2024-12-24 PROCEDURE — 99284 EMERGENCY DEPT VISIT MOD MDM: CPT | Performed by: STUDENT IN AN ORGANIZED HEALTH CARE EDUCATION/TRAINING PROGRAM

## 2024-12-24 PROCEDURE — 99283 EMERGENCY DEPT VISIT LOW MDM: CPT | Performed by: STUDENT IN AN ORGANIZED HEALTH CARE EDUCATION/TRAINING PROGRAM

## 2024-12-24 PROCEDURE — 2500000004 HC RX 250 GENERAL PHARMACY W/ HCPCS (ALT 636 FOR OP/ED): Performed by: STUDENT IN AN ORGANIZED HEALTH CARE EDUCATION/TRAINING PROGRAM

## 2024-12-24 RX ORDER — LIDOCAINE HYDROCHLORIDE 20 MG/ML
1.25 SOLUTION OROPHARYNGEAL AS NEEDED
Qty: 100 ML | Refills: 0 | Status: SHIPPED | OUTPATIENT
Start: 2024-12-24 | End: 2025-01-03

## 2024-12-24 RX ORDER — OXYCODONE AND ACETAMINOPHEN 5; 325 MG/1; MG/1
1 TABLET ORAL ONCE
Status: DISCONTINUED | OUTPATIENT
Start: 2024-12-24 | End: 2024-12-24

## 2024-12-24 RX ORDER — LIDOCAINE HYDROCHLORIDE 20 MG/ML
1.25 SOLUTION OROPHARYNGEAL ONCE
Status: COMPLETED | OUTPATIENT
Start: 2024-12-24 | End: 2024-12-24

## 2024-12-24 RX ORDER — OXYCODONE AND ACETAMINOPHEN 5; 325 MG/1; MG/1
1 TABLET ORAL ONCE
Status: COMPLETED | OUTPATIENT
Start: 2024-12-24 | End: 2024-12-24

## 2024-12-24 RX ADMIN — OXYCODONE HYDROCHLORIDE AND ACETAMINOPHEN 1 TABLET: 5; 325 TABLET ORAL at 04:51

## 2024-12-24 RX ADMIN — DEXAMETHASONE 10 MG: 6 TABLET ORAL at 04:51

## 2024-12-24 RX ADMIN — LIDOCAINE HYDROCHLORIDE 1.25 ML: 20 SOLUTION ORAL at 03:43

## 2024-12-24 ASSESSMENT — LIFESTYLE VARIABLES
HAVE YOU EVER FELT YOU SHOULD CUT DOWN ON YOUR DRINKING: NO
TOTAL SCORE: 0
EVER FELT BAD OR GUILTY ABOUT YOUR DRINKING: NO
HAVE PEOPLE ANNOYED YOU BY CRITICIZING YOUR DRINKING: NO
EVER HAD A DRINK FIRST THING IN THE MORNING TO STEADY YOUR NERVES TO GET RID OF A HANGOVER: NO

## 2024-12-24 ASSESSMENT — PAIN DESCRIPTION - LOCATION: LOCATION: TEETH

## 2024-12-24 ASSESSMENT — COLUMBIA-SUICIDE SEVERITY RATING SCALE - C-SSRS
6. HAVE YOU EVER DONE ANYTHING, STARTED TO DO ANYTHING, OR PREPARED TO DO ANYTHING TO END YOUR LIFE?: NO
2. HAVE YOU ACTUALLY HAD ANY THOUGHTS OF KILLING YOURSELF?: NO
1. IN THE PAST MONTH, HAVE YOU WISHED YOU WERE DEAD OR WISHED YOU COULD GO TO SLEEP AND NOT WAKE UP?: NO

## 2024-12-24 ASSESSMENT — PAIN SCALES - GENERAL: PAINLEVEL_OUTOF10: 10 - WORST POSSIBLE PAIN

## 2024-12-24 ASSESSMENT — PAIN - FUNCTIONAL ASSESSMENT: PAIN_FUNCTIONAL_ASSESSMENT: 0-10

## 2024-12-24 NOTE — ED TRIAGE NOTES
Patient was discharged a few hours ago from [facility] after being seen there multiple times for mouth pain - pt is still having pain in her gums. Was prescribed both Pepcid and lidocaine - patient did not receive the lidocaine when she went to the pharmacy - only the Pepcid which she did not understand because she had no stomach complaints.

## 2024-12-24 NOTE — ED PROVIDER NOTES
Emergency Department Provider Note        History of Present Illness     History provided by: Patient  Limitations to History: None  External Records Reviewed with Brief Summary:  Recent emergency department note and MyChart message from [facility] noting that patient stated that she got the wrong medication that was meant to be prescribed for her, she notes she was supposed to receive lidocaine gel    HPI:  Leida Lizama is a 44 y.o. female presenting for dental pain.  She notes she has gone to [facility] several times for this, on the dental pain has been ongoing for several weeks.  She recently started on amoxicillin and has had 2 doses of that, she was supposed to get lidocaine yesterday she was at [facility] however she says she received Pepcid instead.  After taking that she felt more nauseous and her pain had not improved at all.  She was coming here to get her prescription filled.    Physical Exam   Triage vitals:  T 36.1 °C (97 °F)  HR 71  /85  RR 18  O2 100 % None (Room air)    Physical Exam  Constitutional:       Appearance: Normal appearance.   HENT:      Mouth/Throat:      Comments: Patient has braces on her teeth, diffuse signs of gingivitis, dental caries noted left posterior top and bottom teeth  Cardiovascular:      Rate and Rhythm: Normal rate.   Pulmonary:      Effort: Pulmonary effort is normal.   Skin:     General: Skin is warm and dry.   Neurological:      Mental Status: She is alert and oriented to person, place, and time.   Psychiatric:         Mood and Affect: Mood normal.         Behavior: Behavior normal.          Medical Decision Making & ED Course   Medical Decision Makin y.o. female presenting as per HPI.  Differential is broad and includes dental caries, dental abscess, gingivitis, periodontitis.  Based on physical exam, no signs of dental abscess present.  Patient is already taking amoxicillin which would treat several of the above-mentioned conditions, patient would  primarily benefit from pain control.  Patient was offered dental block, she stated she was afraid of needles and would not want this.  Alternatively we decided to do a dose of Decadron and Percocet while being here.  Patient was also given prescription for lidocaine solution that she can take at home as needed, encouraged to follow-up with dentistry for definitive management as well as continue taking antibiotics.  ----    Differential diagnoses considered include but are not limited to: As per [facility]    Chronic conditions affecting the patient's care: As documented above in [facility]    Care Considerations: As documented above in [facility]    ED Course:  Diagnoses as of 12/24/24 0538   Pain, dental     Disposition   As a result of the work-up, the patient was discharged home.  she was informed of her diagnosis and instructed to come back with any concerns or worsening of condition.  she and was agreeable to the plan as discussed above.  she was given the opportunity to ask questions.  All of the patient's questions were answered.    Procedures   Procedures    Jhony Ortiz DO  Emergency Medicine      Jhony Ortiz DO  Resident  12/24/24 [id number]       Shayne Montiel MD  12/26/24 [id number]

## 2024-12-24 NOTE — DISCHARGE INSTRUCTIONS
Discharge home with reassurance, follow-up with dentistry for definitive management.  Please continue to take your antibiotics as prescribed.  You can use the lidocaine as needed for dental pain.

## 2025-01-27 ENCOUNTER — HOSPITAL ENCOUNTER (OUTPATIENT)
Dept: RADIATION ONCOLOGY | Facility: HOSPITAL | Age: 45
Setting detail: RADIATION/ONCOLOGY SERIES
Discharge: HOME | End: 2025-01-27
Payer: MEDICAID

## 2025-01-27 ENCOUNTER — HOSPITAL ENCOUNTER (OUTPATIENT)
Dept: RADIOLOGY | Facility: HOSPITAL | Age: 45
Discharge: HOME | End: 2025-01-27
Payer: MEDICARE

## 2025-01-27 VITALS
HEART RATE: 71 BPM | DIASTOLIC BLOOD PRESSURE: 77 MMHG | RESPIRATION RATE: 18 BRPM | BODY MASS INDEX: 26.66 KG/M2 | HEIGHT: 65 IN | SYSTOLIC BLOOD PRESSURE: 124 MMHG | TEMPERATURE: 96.8 F | WEIGHT: 160 LBS | OXYGEN SATURATION: 98 %

## 2025-01-27 DIAGNOSIS — D32.9 MENINGIOMA (MULTI): ICD-10-CM

## 2025-01-27 PROCEDURE — A9575 INJ GADOTERATE MEGLUMI 0.1ML: HCPCS | Performed by: STUDENT IN AN ORGANIZED HEALTH CARE EDUCATION/TRAINING PROGRAM

## 2025-01-27 PROCEDURE — 99214 OFFICE O/P EST MOD 30 MIN: CPT | Performed by: STUDENT IN AN ORGANIZED HEALTH CARE EDUCATION/TRAINING PROGRAM

## 2025-01-27 PROCEDURE — 70553 MRI BRAIN STEM W/O & W/DYE: CPT | Performed by: RADIOLOGY

## 2025-01-27 PROCEDURE — 2550000001 HC RX 255 CONTRASTS: Performed by: STUDENT IN AN ORGANIZED HEALTH CARE EDUCATION/TRAINING PROGRAM

## 2025-01-27 PROCEDURE — 70553 MRI BRAIN STEM W/O & W/DYE: CPT

## 2025-01-27 RX ORDER — GADOTERATE MEGLUMINE 376.9 MG/ML
14 INJECTION INTRAVENOUS
Status: COMPLETED | OUTPATIENT
Start: 2025-01-27 | End: 2025-01-27

## 2025-01-27 RX ADMIN — GADOTERATE MEGLUMINE 14 ML: 376.9 INJECTION INTRAVENOUS at 13:32

## 2025-01-27 ASSESSMENT — COLUMBIA-SUICIDE SEVERITY RATING SCALE - C-SSRS
1. IN THE PAST MONTH, HAVE YOU WISHED YOU WERE DEAD OR WISHED YOU COULD GO TO SLEEP AND NOT WAKE UP?: NO
6. HAVE YOU EVER DONE ANYTHING, STARTED TO DO ANYTHING, OR PREPARED TO DO ANYTHING TO END YOUR LIFE?: NO
2. HAVE YOU ACTUALLY HAD ANY THOUGHTS OF KILLING YOURSELF?: NO

## 2025-01-27 ASSESSMENT — ENCOUNTER SYMPTOMS
NEUROLOGICAL NEGATIVE: 1
PSYCHIATRIC NEGATIVE: 1
CONSTITUTIONAL NEGATIVE: 1
LOSS OF SENSATION IN FEET: 0
OCCASIONAL FEELINGS OF UNSTEADINESS: 0
RESPIRATORY NEGATIVE: 1
DEPRESSION: 0
EYES NEGATIVE: 1
GASTROINTESTINAL NEGATIVE: 1
MUSCULOSKELETAL NEGATIVE: 1
CARDIOVASCULAR NEGATIVE: 1
HEMATOLOGIC/LYMPHATIC NEGATIVE: 1

## 2025-01-27 ASSESSMENT — PATIENT HEALTH QUESTIONNAIRE - PHQ9
2. FEELING DOWN, DEPRESSED OR HOPELESS: NOT AT ALL
1. LITTLE INTEREST OR PLEASURE IN DOING THINGS: NOT AT ALL
SUM OF ALL RESPONSES TO PHQ9 QUESTIONS 1 AND 2: 0

## 2025-01-27 NOTE — PROGRESS NOTES
Death Certificate faxed to the [facility]   Radiation Oncology Nursing Note    Pain: The patient's current pain level was assessed.  They report currently having a pain of 0 out of 10.  They feel their pain is under control without the use of pain medications.    Review of Systems:  Review of Systems   Constitutional: Negative.    HENT:  Negative.     Eyes: Negative.    Respiratory: Negative.     Cardiovascular: Negative.    Gastrointestinal: Negative.    Genitourinary: Negative.     Musculoskeletal: Negative.    Skin: Negative.    Neurological: Negative.    Hematological: Negative.    Psychiatric/Behavioral: Negative.

## 2025-01-27 NOTE — PROGRESS NOTES
Radiation Oncology Follow-Up    Patient Name:  Leida Lizama  MRN:  [id number]  :  1980    Referring Provider: Travis Falcon MD, *  Primary Care Provider: No Assigned PCP Generic Provider, MD  Care Team: Patient Care Team:  No Assigned Pcp Generic Provider, MD as PCP - General (General Practice)    Date of Service: 2025     SUBJECTIVE  History of Present Illness:   Leida Lizama is a 44 y.o. 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 She follows with ophthalmology, last saw Dr. Underwood 2023. The patient had an ED visit on 23 with palpitations with no intervention. She was last seen on 7/15/2024.     Her MRI brain today shows stable disease.    She presents for routine follow-up. The patient continues to endorse vision loss of her R eye and blurry vision at L eye. She endorses increased headache at L side. She states that she is doing well otherwise. She was recently in the ED for dental pain at L side and was prescribed oxycodone. Reports pain relief with oxy. She got injection for hair growth today and tolerated well.     Treatment Rendered:    Right Orbital Base of skull meningioma: Proton 5940 cGy in 33 fractions completed 2022   Left Clivus Meningioma: GKRS 13 Gy to 57% IDL completed on 2022    Review of Systems:  Please see the nursing note for complete ROS.     The patient's current pain level was assessed.  They report currently having a pain of 1 out of 10.    Performance Status:   The Karnofsky performance scale today is 80, Normal activity with effort; some signs or symptoms of disease (ECOG equivalent 1).        OBJECTIVE  Vital Signs:  There were no vitals taken for this visit.   Physical Exam:  Physical  Exam  Constitutional:       General: She is not in acute distress.  HENT:      Mouth/Throat:      Mouth: Mucous membranes are moist.   Eyes:      Extraocular Movements: Extraocular movements intact.      Pupils: Pupils are equal, round, and reactive to light.   Cardiovascular:      Rate and Rhythm: Normal rate.   Pulmonary:      Effort: Pulmonary effort is normal. No respiratory distress.   Abdominal:      General: Abdomen is flat.   Musculoskeletal:      Cervical back: Normal range of motion.      Right lower leg: No edema.      Left lower leg: No edema.   Skin:     General: Skin is warm and dry.   Neurological:      Mental Status: She is alert.      Comments: Motor strength 5/5 b/l upper and lower extremities  No sensory deficit   Psychiatric:         Mood and Affect: Mood normal.         Behavior: Behavior normal.     === 01/27/25 ===    MR BRAIN W AND WO CONTRAST    - Impression -  1. Right lateral orbital wall en plaque meningioma with deformation  of the greater wing of the right sphenoid bone and right  intraorbital/extraconal contrast enhancement. The overall findings  are stable as compared to prior study.  2. Additional contrast enhancement is noted within the right middle  cranial fossa, superior orbital fissure, inferior orbital fissure,  cavernous sinus, and pterygopalatine fossa, appears stable.    I personally reviewed the images/study and I agree with the findings  as stated. This study was interpreted at [facility], [place], [place].    MACRO:  None    Signed by: Eva Veliz 1/27/2025 4:37 PM  Dictation workstation:   [id number]       ASSESSMENT:  Leida Lizama is a 44 y.o. 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  She presents for routine follow-up with stable brain MRI.     PLAN:  Return to clinic in a year with a new MRI brain.   Follow-up with ophthalmology/Dr. Underwood to get vision check.    Anabelle Silveira MD PhD,   PGY-2, Radiation Oncology

## 2025-05-13 ENCOUNTER — TELEPHONE (OUTPATIENT)
Dept: RADIATION ONCOLOGY | Facility: HOSPITAL | Age: 45
End: 2025-05-13
Payer: MEDICARE

## 2025-05-13 ENCOUNTER — TELEPHONE (OUTPATIENT)
Dept: ADMISSION | Facility: HOSPITAL | Age: 45
End: 2025-05-13
Payer: MEDICARE

## 2025-05-13 ENCOUNTER — DOCUMENTATION (OUTPATIENT)
Dept: RADIATION ONCOLOGY | Facility: HOSPITAL | Age: 45
End: 2025-05-13
Payer: MEDICARE

## 2025-05-13 DIAGNOSIS — D32.9 MENINGIOMA (MULTI): Primary | ICD-10-CM

## 2025-05-13 RX ORDER — METHYLPREDNISOLONE 4 MG/1
TABLET ORAL
Qty: 21 TABLET | Refills: 0 | Status: SHIPPED | OUTPATIENT
Start: 2025-05-13

## 2025-05-13 NOTE — TELEPHONE ENCOUNTER
Called patient. Patient stating she is having severe headaches. I just spoke to her. HA started getting bad about 1-2 weeks, pain in the top middle of her head, behind the eyes and having intermittent swelling of the right eye where she had surgery(her blind eye). She also states her hearing is going in and out. denies N//V denies balance issues, no cognitive changes. Tylenol and ibuprofen are not helping.  Dr. Falcon notified

## 2025-05-13 NOTE — PROGRESS NOTES
Leida sent a message stating she is having severe headaches. (Nurse) I just spoke to her. HA started getting bad about 1-2 weeks, pain in the top middle of her head, behind the eyes and having intermittent swelling of the right eye where she had surgery(her blind eye). She also states her hearing is going in and out. denies N//V denies balance issues, no cognitive changes. Tylenol and ibuprofen are not helping. She is requesting steroids. She isnt scheduled to be back until 1/2026 with MRI.     Ordered a repeat MRI brain as it has been 5 months since her last MRI brain and medrol dose pack for the time being.

## 2025-05-13 NOTE — TELEPHONE ENCOUNTER
Pt requesting a refill for the steroid that Dr. Falcon prescribed in the past.  She does not remember the name.  She stopped it due to weight gain.  But now she is having a lot of pain and swelling and needs it for that.  Please send to [facility] in [place] [address] [address]. Ph. [phone].  Pt requesting a call back from nursing once sent.  Please advise. Thank you.

## 2025-05-13 NOTE — TELEPHONE ENCOUNTER
Pt called the med onc RN line stating that she left a message for Dr. Falcon's office yesterday about some symptoms she has been having but hasn't heard back yet. She said it is regarding some pain she has been experiencing and wanting to restart her Dex. I forwarded her call to the rad onc office.

## 2025-06-18 DIAGNOSIS — D32.9 MENINGIOMA (MULTI): Primary | ICD-10-CM

## 2025-06-28 ENCOUNTER — OFFICE VISIT (OUTPATIENT)
Dept: URGENT CARE | Age: 45
End: 2025-06-28
Payer: COMMERCIAL

## 2025-06-28 VITALS
HEIGHT: 65 IN | HEART RATE: 80 BPM | DIASTOLIC BLOOD PRESSURE: 83 MMHG | TEMPERATURE: 98.3 F | BODY MASS INDEX: 25.33 KG/M2 | WEIGHT: 152 LBS | RESPIRATION RATE: 16 BRPM | OXYGEN SATURATION: 98 % | SYSTOLIC BLOOD PRESSURE: 123 MMHG

## 2025-06-28 DIAGNOSIS — N39.0 URINARY TRACT INFECTION WITHOUT HEMATURIA, SITE UNSPECIFIED: ICD-10-CM

## 2025-06-28 DIAGNOSIS — N89.8 VAGINAL ITCHING: Primary | ICD-10-CM

## 2025-06-28 LAB
POC APPEARANCE, URINE: ABNORMAL
POC BILIRUBIN, URINE: NEGATIVE
POC BLOOD, URINE: ABNORMAL
POC COLOR, URINE: YELLOW
POC GLUCOSE, URINE: NEGATIVE MG/DL
POC KETONES, URINE: NEGATIVE MG/DL
POC LEUKOCYTES, URINE: ABNORMAL
POC NITRITE,URINE: NEGATIVE
POC PH, URINE: 6 PH
POC PROTEIN, URINE: ABNORMAL MG/DL
POC SPECIFIC GRAVITY, URINE: 1.02
POC UROBILINOGEN, URINE: 0.2 EU/DL
PREGNANCY TEST URINE, POC: NEGATIVE

## 2025-06-28 RX ORDER — SULFAMETHOXAZOLE AND TRIMETHOPRIM 800; 160 MG/1; MG/1
1 TABLET ORAL 2 TIMES DAILY
Qty: 10 TABLET | Refills: 0 | Status: SHIPPED | OUTPATIENT
Start: 2025-06-28 | End: 2025-07-03

## 2025-06-28 RX ORDER — METRONIDAZOLE 500 MG/1
500 TABLET ORAL 2 TIMES DAILY
Qty: 14 TABLET | Refills: 0 | Status: SHIPPED | OUTPATIENT
Start: 2025-06-28 | End: 2025-06-28 | Stop reason: WASHOUT

## 2025-06-28 ASSESSMENT — ENCOUNTER SYMPTOMS: DYSURIA: 0

## 2025-06-28 NOTE — PROGRESS NOTES
"Subjective   Patient ID: Leida Lizama is a 44 y.o. female. They present today with a chief complaint of Vaginal Itching (More moisture. Detox yesterday and urine turned green. Symptoms for a couple of weeks. Not currently sexual active. No concerns about STI's. Changed fragrance of laundry detergent and itches directly after putting on clothes.).    History of Present Illness  Patient is a 44 year old female presenting for vaginal itching. Symptoms have been present for the last 2 weeks. She reports mild odor, more moisture but no discharge and itching. Denies dysuria or urgency but started a vinegar detox yesterday and noticed urine had a green tint to it last night. She reports she is not sexually active and not concerned for STDs at this time. Recent change to laundry detergent.       History provided by:  Patient   used: No    Vaginal Itching      Past Medical History  Allergies as of 06/28/2025    (No Known Allergies)       Prescriptions Prior to Admission[1]     Medical History[2]    Surgical History[3]     reports that she has never smoked. She has never been exposed to tobacco smoke. She has never used smokeless tobacco. She reports that she does not currently use alcohol. She reports that she does not use drugs.    Review of Systems  Review of Systems   Genitourinary:  Negative for dysuria, urgency and vaginal discharge.        Vaginal itching                                  Objective    Vitals:    06/28/25 1208   BP: 123/83   BP Location: Left arm   Patient Position: Sitting   BP Cuff Size: Adult   Pulse: 80   Resp: 16   Temp: 36.8 °C (98.3 °F)   TempSrc: Oral   SpO2: 98%   Weight: 68.9 kg (152 lb)   Height: 1.651 m (5' 5\")     Patient's last menstrual period was 06/04/2025 (approximate).    Physical Exam  Constitutional:       General: She is not in acute distress.     Appearance: Normal appearance. She is normal weight. She is not ill-appearing or toxic-appearing.   HENT:      " Head: Normocephalic.   Eyes:      General:         Right eye: No discharge.         Left eye: No discharge.      Conjunctiva/sclera: Conjunctivae normal.   Neck:      Trachea: Phonation normal.   Cardiovascular:      Rate and Rhythm: Normal rate.      Heart sounds: No murmur heard.     No friction rub. No gallop.   Pulmonary:      Effort: No respiratory distress.      Breath sounds: Normal breath sounds. No stridor. No wheezing.   Abdominal:      Tenderness: There is no abdominal tenderness. There is no right CVA tenderness, left CVA tenderness or guarding.   Neurological:      Mental Status: She is alert.   Psychiatric:         Mood and Affect: Mood normal.         Behavior: Behavior normal.         Thought Content: Thought content normal.         Procedures    Point of Care Test & Imaging Results from this visit  Results for orders placed or performed in visit on 06/28/25   POCT UA Automated manually resulted   Result Value Ref Range    POC Color, Urine Yellow Straw, Yellow, Light-Yellow    POC Appearance, Urine Cloudy (A) Clear    POC Glucose, Urine NEGATIVE NEGATIVE mg/dl    POC Bilirubin, Urine NEGATIVE NEGATIVE    POC Ketones, Urine NEGATIVE NEGATIVE mg/dl    POC Specific Gravity, Urine 1.025 1.005 - 1.035    POC Blood, Urine TRACE-Intact (A) NEGATIVE    POC PH, Urine 6.0 No Reference Range Established PH    POC Protein, Urine 15 (1+) (A) NEGATIVE mg/dl    POC Urobilinogen, Urine 0.2 0.2, 1.0 EU/DL    Poc Nitrite, Urine NEGATIVE NEGATIVE    POC Leukocytes, Urine MODERATE (2+) (A) NEGATIVE   POCT pregnancy, urine manually resulted   Result Value Ref Range    Preg Test, Ur Negative Negative      Imaging  No results found.    Cardiology, Vascular, and Other Imaging  No other imaging results found for the past 2 days      Diagnostic study results (if any) were reviewed by Deedee Keating PA-C.    Assessment/Plan   Allergies, medications, history, and pertinent labs/EKGs/Imaging reviewed by Deedee Keating,  EMILY.     Medical Decision Making  Patient is a 44 year old female presenting for evaluation of vaginal itching. Hemodynamically stable. UA suspicious for infection, no green discolor like patient had mentioned occurred last night. No CVA tenderness or abdominal tenderness to suggest ureterolithiasis or pyelonephritis. Will send urine for culture but start antibiotics pending results given presenting symptoms and results of UA. Bv and yeast swab was also sent for vaginal symptoms. Patient agreeable with waiting for results prior to treatment. Declined STD testing. ER if flank pain, abdominal pain, vomiting, fever.      At time of discharge, patient was clinically well-appearing and appropriate for outpatient management. The patient/parent/guardian was educated regarding diagnosis, supportive care, OTC and Rx medications. The patient/parent/guardian was given the opportunity to ask questions prior to discharge. They verbalized understanding of discussion of treatment plan, expected course of illness and/or injury, indications on when to return to , when to seek further evaluation in ED/call 911, and the need to follow up with PCP and/or specialist as referred. Patient/parent/guardian was provided with work/school documentation if requested. Patient stable upon discharge.     Orders and Diagnoses  Diagnoses and all orders for this visit:  Vaginal itching  -     Vaginitis Gram Stain For Bacterial Vaginosis + Yeast  -     POCT UA Automated manually resulted  -     POCT pregnancy, urine manually resulted  Urinary tract infection without hematuria, site unspecified  -     Urine Culture  -     sulfamethoxazole-trimethoprim (Bactrim DS) 800-160 mg tablet; Take 1 tablet by mouth 2 times a day for 5 days.      Medical Admin Record      Patient disposition: Home    Electronically signed by Deedee Keating PA-C  1:19 PM           [1] (Not in a hospital admission)   [2]   Past Medical History:  Diagnosis Date    Unspecified  pre-eclampsia, unspecified trimester ([facility])    [3]   Past Surgical History:  Procedure Laterality Date    BRAIN SURGERY      meningioma     SECTION, CLASSIC  2014     Section    COLPOSCOPY  2014    Colposcopy    DILATION AND CURETTAGE OF UTERUS  2014    Dilation And Curettage    TUBAL LIGATION  2016    Tubal Ligation

## 2025-06-28 NOTE — PATIENT INSTRUCTIONS
Return if new or worsening symptoms   Your results will be posted to [facility] and we will call if any additional treatment needed

## 2025-06-29 LAB — BV SCORE VAG QL: NORMAL

## 2025-06-30 LAB — BACTERIA UR CULT: NORMAL

## 2025-07-02 ENCOUNTER — HOSPITAL ENCOUNTER (OUTPATIENT)
Dept: RADIOLOGY | Facility: CLINIC | Age: 45
Discharge: HOME | End: 2025-07-02
Payer: COMMERCIAL

## 2025-07-02 DIAGNOSIS — D32.9 MENINGIOMA (MULTI): ICD-10-CM

## 2025-07-02 PROCEDURE — 70553 MRI BRAIN STEM W/O & W/DYE: CPT

## 2025-07-02 PROCEDURE — 2550000001 HC RX 255 CONTRASTS: Performed by: STUDENT IN AN ORGANIZED HEALTH CARE EDUCATION/TRAINING PROGRAM

## 2025-07-02 PROCEDURE — 70553 MRI BRAIN STEM W/O & W/DYE: CPT | Performed by: RADIOLOGY

## 2025-07-02 PROCEDURE — A9575 INJ GADOTERATE MEGLUMI 0.1ML: HCPCS | Performed by: STUDENT IN AN ORGANIZED HEALTH CARE EDUCATION/TRAINING PROGRAM

## 2025-07-02 RX ORDER — GADOTERATE MEGLUMINE 376.9 MG/ML
0.2 INJECTION INTRAVENOUS
Status: COMPLETED | OUTPATIENT
Start: 2025-07-02 | End: 2025-07-02

## 2025-07-02 RX ADMIN — GADOTERATE MEGLUMINE 14 ML: 376.9 INJECTION INTRAVENOUS at 13:34

## 2025-07-17 ENCOUNTER — HOSPITAL ENCOUNTER (OUTPATIENT)
Dept: RADIOLOGY | Facility: HOSPITAL | Age: 45
Discharge: HOME | End: 2025-07-17
Payer: COMMERCIAL

## 2025-07-17 DIAGNOSIS — Z12.31 ENCOUNTER FOR SCREENING MAMMOGRAM FOR MALIGNANT NEOPLASM OF BREAST: ICD-10-CM

## 2025-07-17 PROCEDURE — 77063 BREAST TOMOSYNTHESIS BI: CPT

## 2025-07-21 ENCOUNTER — OFFICE VISIT (OUTPATIENT)
Dept: URGENT CARE | Age: 45
End: 2025-07-21
Payer: COMMERCIAL

## 2025-07-21 VITALS
DIASTOLIC BLOOD PRESSURE: 72 MMHG | HEART RATE: 68 BPM | RESPIRATION RATE: 16 BRPM | SYSTOLIC BLOOD PRESSURE: 116 MMHG | TEMPERATURE: 98.2 F | OXYGEN SATURATION: 98 %

## 2025-07-21 DIAGNOSIS — N76.0 ACUTE VAGINITIS: Primary | ICD-10-CM

## 2025-07-21 DIAGNOSIS — B37.31 VAGINAL YEAST INFECTION: ICD-10-CM

## 2025-07-21 RX ORDER — METRONIDAZOLE 7.5 MG/G
GEL VAGINAL
Qty: 70 G | Refills: 1 | Status: SHIPPED | OUTPATIENT
Start: 2025-07-21

## 2025-07-21 ASSESSMENT — ENCOUNTER SYMPTOMS
DYSURIA: 0
CONSTITUTIONAL NEGATIVE: 1
GASTROINTESTINAL NEGATIVE: 1

## 2025-07-21 NOTE — PROGRESS NOTES
Subjective   Patient ID: Leida Lizama is a 44 y.o. female. They present today with a chief complaint of Vaginal Discharge (Greenish discharge with odor. (Not strong) same symptoms from 6/28. Wants to get STI check also that wasn't collected last visit. ).    History of Present Illness  HPI    Past Medical History  Allergies as of 07/21/2025    (No Known Allergies)       Prescriptions Prior to Admission[1]     Medical History[2]    Surgical History[3]     reports that she has never smoked. She has never been exposed to tobacco smoke. She has never used smokeless tobacco. She reports that she does not currently use alcohol. She reports that she does not use drugs.    Review of Systems  Review of Systems   Constitutional: Negative.    Gastrointestinal: Negative.    Genitourinary:  Positive for vaginal discharge. Negative for dysuria and vaginal pain.                                  Objective    Vitals:    07/21/25 1644   BP: 116/72   BP Location: Left arm   Patient Position: Sitting   BP Cuff Size: Adult   Pulse: 68   Resp: 16   Temp: 36.8 °C (98.2 °F)   TempSrc: Oral   SpO2: 98%     Patient's last menstrual period was 06/04/2025 (approximate).    Physical Exam  Exam conducted with a chaperone present.   Constitutional:       Appearance: Normal appearance.   Abdominal:      Tenderness: There is no abdominal tenderness.   Genitourinary:     General: Normal vulva.      Exam position: Lithotomy position.      Vagina: Vaginal discharge and erythema present.      Cervix: Normal.      Uterus: Normal.       Adnexa: Right adnexa normal and left adnexa normal.      Comments: Profuse greenish vaginal discharge.    Neurological:      Mental Status: She is alert.         Procedures    Point of Care Test & Imaging Results from this visit  No results found for this visit on 07/21/25.   Imaging  No results found.    Cardiology, Vascular, and Other Imaging  No other imaging results found for the past 2 days      Diagnostic study  results (if any) were reviewed by Yasmine Franco MD.    Assessment/Plan   Allergies, medications, history, and pertinent labs/EKGs/Imaging reviewed by Yasmine Franco MD.     Medical Decision Making   Acute vaginitis, while awaiting for common pelvic infections test result, will treat patient with  Vaginal Metrogel  .    Orders and Diagnoses  There are no diagnoses linked to this encounter.    Medical Admin Record      Patient disposition: Home    Electronically signed by Yasmine Franco MD  5:19 PM           [1] (Not in a hospital admission)  [2]   Past Medical History:  Diagnosis Date    Personal history of irradiation 2021    Unspecified pre-eclampsia, unspecified trimester ([facility])    [3]   Past Surgical History:  Procedure Laterality Date    BRAIN SURGERY      meningioma     SECTION, CLASSIC  2014     Section    COLPOSCOPY  2014    Colposcopy    DILATION AND CURETTAGE OF UTERUS  2014    Dilation And Curettage    TUBAL LIGATION  2016    Tubal Ligation

## 2025-07-22 LAB
BV SCORE VAG QL: NORMAL
C TRACH RRNA SPEC QL NAA+PROBE: NOT DETECTED
N GONORRHOEA RRNA SPEC QL NAA+PROBE: NOT DETECTED
QUEST GC CT AMPLIFIED (ALWAYS MESSAGE): NORMAL
T VAGINALIS RRNA SPEC QL NAA+PROBE: NOT DETECTED

## 2025-07-22 RX ORDER — FLUCONAZOLE 200 MG/1
200 TABLET ORAL ONCE
Qty: 2 TABLET | Refills: 0 | Status: SHIPPED | OUTPATIENT
Start: 2025-07-22 | End: 2025-07-22

## 2025-07-28 ENCOUNTER — APPOINTMENT (OUTPATIENT)
Dept: PRIMARY CARE | Facility: CLINIC | Age: 45
End: 2025-07-28
Payer: COMMERCIAL

## 2026-01-28 ENCOUNTER — APPOINTMENT (OUTPATIENT)
Dept: RADIOLOGY | Facility: HOSPITAL | Age: 46
End: 2026-01-28
Payer: MEDICARE